# Patient Record
Sex: MALE | Race: WHITE | NOT HISPANIC OR LATINO | Employment: FULL TIME | URBAN - METROPOLITAN AREA
[De-identification: names, ages, dates, MRNs, and addresses within clinical notes are randomized per-mention and may not be internally consistent; named-entity substitution may affect disease eponyms.]

---

## 2017-02-07 ENCOUNTER — OFFICE VISIT (OUTPATIENT)
Dept: INTERNAL MEDICINE | Facility: CLINIC | Age: 37
End: 2017-02-07
Payer: COMMERCIAL

## 2017-02-07 VITALS
OXYGEN SATURATION: 100 % | DIASTOLIC BLOOD PRESSURE: 71 MMHG | TEMPERATURE: 98.6 F | WEIGHT: 190 LBS | BODY MASS INDEX: 24.38 KG/M2 | HEART RATE: 83 BPM | HEIGHT: 74 IN | SYSTOLIC BLOOD PRESSURE: 115 MMHG

## 2017-02-07 DIAGNOSIS — J02.0 ACUTE STREPTOCOCCAL PHARYNGITIS: ICD-10-CM

## 2017-02-07 DIAGNOSIS — R07.0 THROAT PAIN: Primary | ICD-10-CM

## 2017-02-07 LAB
DEPRECATED S PYO AG THROAT QL EIA: ABNORMAL
MICRO REPORT STATUS: ABNORMAL
SPECIMEN SOURCE: ABNORMAL

## 2017-02-07 PROCEDURE — 87880 STREP A ASSAY W/OPTIC: CPT | Performed by: NURSE PRACTITIONER

## 2017-02-07 PROCEDURE — 99213 OFFICE O/P EST LOW 20 MIN: CPT | Performed by: NURSE PRACTITIONER

## 2017-02-07 RX ORDER — PENICILLIN V POTASSIUM 500 MG/1
500 TABLET, FILM COATED ORAL 2 TIMES DAILY
Qty: 20 TABLET | Refills: 0 | Status: SHIPPED
Start: 2017-02-07 | End: 2017-03-06

## 2017-02-07 ASSESSMENT — PAIN SCALES - GENERAL: PAINLEVEL: SEVERE PAIN (7)

## 2017-02-07 NOTE — NURSING NOTE
"Chief Complaint   Patient presents with     Pharyngitis       Initial /71 mmHg  Pulse 83  Temp(Src) 98.6  F (37  C) (Oral)  Ht 6' 2.25\" (1.886 m)  Wt 190 lb (86.183 kg)  BMI 24.23 kg/m2  SpO2 100% Estimated body mass index is 24.23 kg/(m^2) as calculated from the following:    Height as of this encounter: 6' 2.25\" (1.886 m).    Weight as of this encounter: 190 lb (86.183 kg).  Medication Reconciliation: complete   LETY Gutierrez, SMA    "

## 2017-02-07 NOTE — PROGRESS NOTES
SUBJECTIVE:                                                    Tommy Best is a 37 year old male who presents to clinic today for the following health issues:      Acute Illness   Acute illness concerns: Sore Throat   Onset: 2 days     Fever: no     Chills/Sweats: YES- beginning chills    Headache (location?): no     Sinus Pressure:no    Conjunctivitis:  YES- both eyes    Ear Pain: no    Rhinorrhea: YES- green mucus    Congestion: YES- throat and ear    Sore Throat: YES     Cough: YES-productive of clear sputum    Wheeze: no     Decreased Appetite: no     Nausea: no     Vomiting: no     Diarrhea:  no     Dysuria/Freq.: YES- slightly pain urinating the on Monday.    Fatigue/Achiness: YES- all over body    Sick/Strep Exposure: YES- community [facility] with child     Therapies Tried and outcome: cough drops, advil, ibu,     Tolerating oral intake  States he normally gets pain with urination whenever he has a fever. Describes as mild. No dysuria today  Denies SOB and wheezing      Problem list and histories reviewed & adjusted, as indicated.  Additional history: none    Patient Active Problem List   Diagnosis     Attention deficit disorder     Photosensitivity     Hyperlipidemia LDL goal <160     Past Surgical History   Procedure Laterality Date     No history of surgery         Social History   Substance Use Topics     Smoking status: Never Smoker      Smokeless tobacco: Never Used     Alcohol Use: Yes     Family History   Problem Relation Age of Onset     Family History Negative Mother      Alcohol/Drug Father      alcholol     DIABETES Father      Type 2     DIABETES Maternal Grandfather      Type 1     DIABETES Paternal Grandmother      Type 2     Cancer - colorectal Paternal Grandfather      Age 60     Chemical Addiction Father      Alcoholic     Hypertension Paternal Grandfather      Hypertension Father      Colon Cancer Paternal Grandfather      Anxiety Disorder Father      Substance Abuse Father      Thyroid  "Disease Father      Obesity Father          Current Outpatient Prescriptions   Medication Sig Dispense Refill     penicillin V potassium (VEETID) 500 MG tablet Take 1 tablet (500 mg) by mouth 2 times daily 20 tablet 0     methylphenidate (METADATE CD) 30 MG CR capsule Take 1 capsule (30 mg) by mouth every morning On work days. 30 capsule 0     methylphenidate (METADATE CD) 20 MG CR capsule Take 1 capsule (20 mg) by mouth daily 1 capsule at night 30 capsule 0     methylphenidate (METADATE CD) 30 MG CR capsule Take 1 capsule (30 mg) by mouth every morning On work days. 30 capsule 0     methylphenidate (METADATE CD) 20 MG CR capsule Take 1 capsule (20 mg) by mouth daily 1 capsule at night 30 capsule 0     sildenafil (VIAGRA) 100 MG tablet Take 0.5-1 tablets ( mg) by mouth daily as needed for erectile dysfunction Take 30 min to 4 hours before intercourse.  Never use with nitroglycerin, terazosin or doxazosin. 6 tablet 11     Problem list, Medication list, Allergies, and Medical/Social/Surgical histories reviewed in [facility] and updated as appropriate.    ROS:  Constitutional, HEENT, cardiovascular, pulmonary, gi and gu systems are negative, except as otherwise noted.    OBJECTIVE:                                                    /71 mmHg  Pulse 83  Temp(Src) 98.6  F (37  C) (Oral)  Ht 6' 2.25\" (1.886 m)  Wt 190 lb (86.183 kg)  BMI 24.23 kg/m2  SpO2 100%  Body mass index is 24.23 kg/(m^2).  GENERAL: healthy, alert and no distress  EYES: Eyes grossly normal to inspection, PERRL and conjunctivae and sclerae normal  HENT: normal cephalic/atraumatic, ear canals and TM's normal, nose and mouth without ulcers or lesions, oropharynx clear, oral mucous membranes moist, tonsillar hypertrophy, tonsillar erythema, tonsillar exudate and sinuses: not tender  NECK: cervical adenopathy left  RESP: lungs clear to auscultation - no rales, rhonchi or wheezes  CV: regular rate and rhythm, normal S1 S2, no S3 or S4, no " murmur, click or rub    Diagnostic Test Results:  Strep screen - Positive     ASSESSMENT/PLAN:                                                        ICD-10-CM    1. Throat pain R07.0 Rapid strep screen   2. Acute streptococcal pharyngitis J02.0 penicillin V potassium (VEETID) 500 MG tablet       Discussed self cares and handout given  May take ibuprofen as needed for pain  Stay hydrated  Stay home from work for 24 hours after initiating antibiotics. Hand hygiene    NITA [facility]

## 2017-02-07 NOTE — MR AVS SNAPSHOT
After Visit Summary   2/7/2017    Tommy Best    MRN: [id number]           Patient Information     Date Of Birth          1980        Visit Information        Provider Department      2/7/2017 5:20 PM Kathy Shrestha APRN [facility]        Today's Diagnoses     Throat pain    -  1     Acute streptococcal pharyngitis           Care Instructions      Pharyngitis: Strep (Confirmed)     You have had a positive test for strep throat. Strep throat is a contagious illness. It is spread by coughing, kissing or by touching others after touching your mouth or nose. Symptoms include throat pain which is worse with swallowing, aching all over, headache and fever. It is treated with antibiotic medication. This should help you start to feel better within 1-2 days.  Home care    Rest at home. Drink plenty of fluids to avoid dehydration.    No work or school for the first 2 days of taking the antibiotics. After this time, you will not be contagious. You can then return to school or work if you are feeling better.     The antibiotic medication must be taken for the full 10 days, even if you feel better. This is very important to ensure the infection is treated. It is also important to prevent drug-resistent organisms from developing. If you were given an antibiotic shot, no more antibiotics are needed.    You may use acetaminophen (Tylenol) or ibuprofen (Motrin, Advil) to control pain or fever, unless another medicine was prescribed for this. (NOTE: If you have chronic liver or kidney disease or ever had a stomach ulcer or GI bleeding, talk with your doctor before using these medicines.)    Throat lozenges or sprays (such as Chloraseptic) help reduce pain. Gargling with warm salt water will also reduce throat pain. Dissolve 1/2 teaspoon of salt in 1 glass of warm water. This may be useful just before meals.     Soft foods are okay. Avoid salty or spicy foods.  Follow-up care  Follow  up with your healthcare provider or our staff if you are not improving over the next week.  When to seek medical advice  Call your healthcare provider right away if any of these occur:    Fever as directed by your doctor     New or worsening ear pain, sinus pain, or headache    Painful lumps in the back of neck    Stiff neck    Lymph nodes are getting larger or becoming soft in the middle    Inability to swallow liquids, excessive drooling, or inability to open mouth wide due to throat pain    Signs of dehydration (very dark urine or no urine, sunken eyes, dizziness)    Trouble breathing or noisy breathing    Muffled voice    New rash    8374-1591 [employer]. [address] [place] [address]. All rights reserved. This information is not intended as a substitute for professional medical care. Always follow your healthcare professional's instructions.              Follow-ups after your visit        Who to contact     If you have questions or need follow up information about today's clinic visit or your schedule please contact [facility] directly at [phone].  Normal or non-critical lab and imaging results will be communicated to you by [employer], letter or phone within 4 business days after the clinic has received the results. If you do not hear from us within 7 days, please contact the clinic through [employer] or phone. If you have a critical or abnormal lab result, we will notify you by phone as soon as possible.  Submit refill requests through [employer] or call your pharmacy and they will forward the refill request to us. Please allow 3 business days for your refill to be completed.          Additional Information About Your Visit        [employer] Information     [employer] gives you secure access to your electronic health record. If you see a primary care provider, you can also send messages to your care team and make appointments. If you have questions, please call your  "primary care clinic.  If you do not have a primary care provider, please call [phone] and they will assist you.        Care EveryWhere ID     This is your Care EveryWhere ID. This could be used by other organizations to access your [place] medical records  [id number]        Your Vitals Were     Pulse Temperature Height BMI (Body Mass Index) Pulse Oximetry       83 98.6  F (37  C) (Oral) 6' 2.25\" (1.886 m) 24.23 kg/m2 100%        Blood Pressure from Last 3 Encounters:   02/07/17 115/71   11/18/16 113/64   08/05/16 120/65    Weight from Last 3 Encounters:   02/07/17 190 lb (86.183 kg)   11/18/16 194 lb (87.998 kg)   08/05/16 192 lb (87.091 kg)              We Performed the Following     Rapid strep screen          Today's Medication Changes          These changes are accurate as of: 2/7/17  5:43 PM.  If you have any questions, ask your nurse or doctor.               Start taking these medicines.        Dose/Directions    penicillin V potassium 500 MG tablet   Commonly known as:  VEETID   Used for:  Acute streptococcal pharyngitis   Started by:  Kathy Shrestha APRN CNP        Dose:  500 mg   Take 1 tablet (500 mg) by mouth 2 times daily   Quantity:  20 tablet   Refills:  0            Where to get your medicines      These medications were sent to [facility]/pharmacy #[id number] - [place] [address] AT CORNER OF [address] [place] [id number]     Phone:  [phone]    - penicillin V potassium 500 MG tablet             Primary Care Provider Office Phone # Fax #    Moose Parson [phone]       [facility] [address] [facility] [address]        Thank you!     Thank you for choosing [facility]  for your care. Our goal is always to provide you with excellent care. Hearing back from our patients is one way we can continue to improve our services. Please take a few minutes to complete the written survey that you may " receive in the mail after your visit with us. Thank you!             Your Updated Medication List - Protect others around you: Learn how to safely use, store and throw away your medicines at www.disposemymeds.org.          This list is accurate as of: 2/7/17  5:43 PM.  Always use your most recent med list.                   Brand Name Dispense Instructions for use    * methylphenidate 30 MG CR capsule    METADATE CD    30 capsule    Take 1 capsule (30 mg) by mouth every morning On work days.       * methylphenidate 20 MG CR capsule    METADATE CD    30 capsule    Take 1 capsule (20 mg) by mouth daily 1 capsule at night       * methylphenidate 30 MG CR capsule    METADATE CD    30 capsule    Take 1 capsule (30 mg) by mouth every morning On work days.       * methylphenidate 20 MG CR capsule    METADATE CD    30 capsule    Take 1 capsule (20 mg) by mouth daily 1 capsule at night       penicillin V potassium 500 MG tablet    VEETID    20 tablet    Take 1 tablet (500 mg) by mouth 2 times daily       sildenafil 100 MG cap/tab    VIAGRA    6 tablet    Take 0.5-1 tablets ( mg) by mouth daily as needed for erectile dysfunction Take 30 min to 4 hours before intercourse.  Never use with nitroglycerin, terazosin or doxazosin.       * Notice:  This list has 4 medication(s) that are the same as other medications prescribed for you. Read the directions carefully, and ask your doctor or other care provider to review them with you.

## 2017-02-07 NOTE — PATIENT INSTRUCTIONS
Pharyngitis: Strep (Confirmed)     You have had a positive test for strep throat. Strep throat is a contagious illness. It is spread by coughing, kissing or by touching others after touching your mouth or nose. Symptoms include throat pain which is worse with swallowing, aching all over, headache and fever. It is treated with antibiotic medication. This should help you start to feel better within 1-2 days.  Home care    Rest at home. Drink plenty of fluids to avoid dehydration.    No work or school for the first 2 days of taking the antibiotics. After this time, you will not be contagious. You can then return to school or work if you are feeling better.     The antibiotic medication must be taken for the full 10 days, even if you feel better. This is very important to ensure the infection is treated. It is also important to prevent drug-resistent organisms from developing. If you were given an antibiotic shot, no more antibiotics are needed.    You may use acetaminophen (Tylenol) or ibuprofen (Motrin, Advil) to control pain or fever, unless another medicine was prescribed for this. (NOTE: If you have chronic liver or kidney disease or ever had a stomach ulcer or GI bleeding, talk with your doctor before using these medicines.)    Throat lozenges or sprays (such as Chloraseptic) help reduce pain. Gargling with warm salt water will also reduce throat pain. Dissolve 1/2 teaspoon of salt in 1 glass of warm water. This may be useful just before meals.     Soft foods are okay. Avoid salty or spicy foods.  Follow-up care  Follow up with your healthcare provider or our staff if you are not improving over the next week.  When to seek medical advice  Call your healthcare provider right away if any of these occur:    Fever as directed by your doctor     New or worsening ear pain, sinus pain, or headache    Painful lumps in the back of neck    Stiff neck    Lymph nodes are getting larger or becoming soft in the  middle    Inability to swallow liquids, excessive drooling, or inability to open mouth wide due to throat pain    Signs of dehydration (very dark urine or no urine, sunken eyes, dizziness)    Trouble breathing or noisy breathing    Muffled voice    New rash    0217-5138 [employer]. [address] [place] [address]. All rights reserved. This information is not intended as a substitute for professional medical care. Always follow your healthcare professional's instructions.

## 2017-03-06 ENCOUNTER — OFFICE VISIT (OUTPATIENT)
Dept: FAMILY MEDICINE | Facility: CLINIC | Age: 37
End: 2017-03-06
Payer: COMMERCIAL

## 2017-03-06 VITALS
WEIGHT: 192 LBS | SYSTOLIC BLOOD PRESSURE: 121 MMHG | BODY MASS INDEX: 24.49 KG/M2 | DIASTOLIC BLOOD PRESSURE: 70 MMHG | HEART RATE: 70 BPM | TEMPERATURE: 97.3 F | OXYGEN SATURATION: 98 %

## 2017-03-06 DIAGNOSIS — F98.8 ATTENTION DEFICIT DISORDER: Primary | ICD-10-CM

## 2017-03-06 PROCEDURE — 99214 OFFICE O/P EST MOD 30 MIN: CPT | Performed by: FAMILY MEDICINE

## 2017-03-06 RX ORDER — METHYLPHENIDATE HYDROCHLORIDE 20 MG/1
20 CAPSULE, EXTENDED RELEASE ORAL DAILY
Qty: 30 CAPSULE | Refills: 0 | Status: SHIPPED | OUTPATIENT
Start: 2017-03-06 | End: 2017-04-05

## 2017-03-06 RX ORDER — METHYLPHENIDATE HYDROCHLORIDE 30 MG/1
30 CAPSULE, EXTENDED RELEASE ORAL DAILY
Qty: 30 CAPSULE | Refills: 0 | Status: SHIPPED | OUTPATIENT
Start: 2017-05-07 | End: 2017-06-06

## 2017-03-06 RX ORDER — METHYLPHENIDATE HYDROCHLORIDE 30 MG/1
30 CAPSULE, EXTENDED RELEASE ORAL DAILY
Qty: 30 CAPSULE | Refills: 0 | Status: SHIPPED | OUTPATIENT
Start: 2017-03-06 | End: 2017-04-05

## 2017-03-06 RX ORDER — METHYLPHENIDATE HYDROCHLORIDE 20 MG/1
20 CAPSULE, EXTENDED RELEASE ORAL DAILY
Qty: 30 CAPSULE | Refills: 0 | Status: SHIPPED | OUTPATIENT
Start: 2017-04-06 | End: 2017-05-06

## 2017-03-06 RX ORDER — METHYLPHENIDATE HYDROCHLORIDE 30 MG/1
30 CAPSULE, EXTENDED RELEASE ORAL DAILY
Qty: 30 CAPSULE | Refills: 0 | Status: SHIPPED | OUTPATIENT
Start: 2017-04-06 | End: 2017-05-06

## 2017-03-06 RX ORDER — METHYLPHENIDATE HYDROCHLORIDE 20 MG/1
20 CAPSULE, EXTENDED RELEASE ORAL DAILY
Qty: 30 CAPSULE | Refills: 0 | Status: SHIPPED | OUTPATIENT
Start: 2017-05-07 | End: 2017-06-06

## 2017-03-06 ASSESSMENT — PAIN SCALES - GENERAL: PAINLEVEL: NO PAIN (0)

## 2017-03-06 NOTE — PROGRESS NOTES
SUBJECTIVE:                                                    Tommy Best is a 37 year old male who presents to clinic today for the following health issues:      Medication Followup of Methylphenidate    Taking Medication as prescribed: yes    Side Effects:  None    Medication Helping Symptoms:  yes       Problem list and histories reviewed & adjusted, as indicated.    No problems with medications     Patient does better with meds   He feels like this keeps him focus and stay on task     No problems with sleep     No palpitations       No tics   Patient had some eye twitching   They are more dry then normal     He does drink caffeine 1 cup of coffee and one diet coke a day     O: /70  Pulse 70  Temp 97.3  F (36.3  C) (Oral)  Wt 192 lb (87.1 kg)  SpO2 98%  BMI 24.49 kg/m2     Chest wall normal to inspection and palpation. Good excursion bilaterally. Lungs clear to auscultation. Good air movement bilaterally without rales, wheezes, or rhonchi.   Regular rate and  rhythm. S1 and S2 normal, no murmurs, clicks, gallops or rubs. No edema or JVD.      ICD-10-CM    1. Attention deficit disorder F98.8 methylphenidate (METADATE CD) 30 MG CR capsule     methylphenidate (METADATE CD) 30 MG CR capsule     methylphenidate (METADATE CD) 30 MG CR capsule     methylphenidate (METADATE CD) 20 MG CR capsule     methylphenidate (METADATE CD) 20 MG CR capsule     methylphenidate (METADATE CD) 20 MG CR capsule       Recheck in 3 months

## 2017-03-06 NOTE — MR AVS SNAPSHOT
After Visit Summary   3/6/2017    Tommy Best    MRN: [id number]           Patient Information     Date Of Birth          1980        Visit Information        Provider Department      3/6/2017 2:20 PM Moose Parson MD [facility]        Today's Diagnoses     Attention deficit disorder    -  1       Follow-ups after your visit        Who to contact     If you have questions or need follow up information about today's clinic visit or your schedule please contact [facility] directly at [phone].  Normal or non-critical lab and imaging results will be communicated to you by [employer], letter or phone within 4 business days after the clinic has received the results. If you do not hear from us within 7 days, please contact the clinic through [employer] or phone. If you have a critical or abnormal lab result, we will notify you by phone as soon as possible.  Submit refill requests through [employer] or call your pharmacy and they will forward the refill request to us. Please allow 3 business days for your refill to be completed.          Additional Information About Your Visit        MyChart Information     [employer] gives you secure access to your electronic health record. If you see a primary care provider, you can also send messages to your care team and make appointments. If you have questions, please call your primary care clinic.  If you do not have a primary care provider, please call [phone] and they will assist you.        Care EveryWhere ID     This is your Care EveryWhere ID. This could be used by other organizations to access your [place] medical records  [id number]        Your Vitals Were     Pulse Temperature Pulse Oximetry BMI (Body Mass Index)          70 97.3  F (36.3  C) (Oral) 98% 24.49 kg/m2         Blood Pressure from Last 3 Encounters:   03/06/17 121/70   02/07/17 115/71   11/18/16 113/64    Weight from Last 3 Encounters:    03/06/17 192 lb (87.1 kg)   02/07/17 190 lb (86.2 kg)   11/18/16 194 lb (88 kg)              Today, you had the following     No orders found for display         Today's Medication Changes          These changes are accurate as of: 3/6/17  2:53 PM.  If you have any questions, ask your nurse or doctor.               These medicines have changed or have updated prescriptions.        Dose/Directions    * methylphenidate 30 MG CR capsule   Commonly known as:  METADATE CD   This may have changed:  Another medication with the same name was added. Make sure you understand how and when to take each.   Used for:  Attention deficit disorder   Changed by:  Moose Parson MD        Dose:  30 mg   Take 1 capsule (30 mg) by mouth every morning On work days.   Quantity:  30 capsule   Refills:  0       * methylphenidate 20 MG CR capsule   Commonly known as:  METADATE CD   This may have changed:  Another medication with the same name was added. Make sure you understand how and when to take each.   Used for:  Attention deficit disorder   Changed by:  Moose Parson MD        Dose:  20 mg   Take 1 capsule (20 mg) by mouth daily 1 capsule at night   Quantity:  30 capsule   Refills:  0       * methylphenidate 30 MG CR capsule   Commonly known as:  METADATE CD   This may have changed:  Another medication with the same name was added. Make sure you understand how and when to take each.   Used for:  Attention deficit disorder   Changed by:  Moose Parson MD        Dose:  30 mg   Take 1 capsule (30 mg) by mouth every morning On work days.   Quantity:  30 capsule   Refills:  0       * methylphenidate 20 MG CR capsule   Commonly known as:  METADATE CD   This may have changed:  Another medication with the same name was added. Make sure you understand how and when to take each.   Used for:  Attention deficit disorder   Changed by:  Moose Parson MD        Dose:  20 mg   Take 1 capsule (20 mg) by mouth daily 1  capsule at night   Quantity:  30 capsule   Refills:  0       * methylphenidate 30 MG CR capsule   Commonly known as:  METADATE CD   This may have changed:  You were already taking a medication with the same name, and this prescription was added. Make sure you understand how and when to take each.   Used for:  Attention deficit disorder   Changed by:  Moose Parson MD        Dose:  30 mg   Take 1 capsule (30 mg) by mouth daily   Quantity:  30 capsule   Refills:  0       * methylphenidate 20 MG CR capsule   Commonly known as:  METADATE CD   This may have changed:  You were already taking a medication with the same name, and this prescription was added. Make sure you understand how and when to take each.   Used for:  Attention deficit disorder   Changed by:  Moose Parson MD        Dose:  20 mg   Take 1 capsule (20 mg) by mouth daily   Quantity:  30 capsule   Refills:  0       * methylphenidate 30 MG CR capsule   Commonly known as:  METADATE CD   This may have changed:  You were already taking a medication with the same name, and this prescription was added. Make sure you understand how and when to take each.   Used for:  Attention deficit disorder   Changed by:  Moose Parson MD        Dose:  30 mg   Start taking on:  4/6/2017   Take 1 capsule (30 mg) by mouth daily   Quantity:  30 capsule   Refills:  0       * methylphenidate 20 MG CR capsule   Commonly known as:  METADATE CD   This may have changed:  You were already taking a medication with the same name, and this prescription was added. Make sure you understand how and when to take each.   Used for:  Attention deficit disorder   Changed by:  Moose Parson MD        Dose:  20 mg   Start taking on:  4/6/2017   Take 1 capsule (20 mg) by mouth daily   Quantity:  30 capsule   Refills:  0       * methylphenidate 30 MG CR capsule   Commonly known as:  METADATE CD   This may have changed:  You were already taking a medication with the same  name, and this prescription was added. Make sure you understand how and when to take each.   Used for:  Attention deficit disorder   Changed by:  Moose Parson MD        Dose:  30 mg   Start taking on:  5/7/2017   Take 1 capsule (30 mg) by mouth daily   Quantity:  30 capsule   Refills:  0       * methylphenidate 20 MG CR capsule   Commonly known as:  METADATE CD   This may have changed:  You were already taking a medication with the same name, and this prescription was added. Make sure you understand how and when to take each.   Used for:  Attention deficit disorder   Changed by:  Moose Parson MD        Dose:  20 mg   Start taking on:  5/7/2017   Take 1 capsule (20 mg) by mouth daily   Quantity:  30 capsule   Refills:  0       * Notice:  This list has 10 medication(s) that are the same as other medications prescribed for you. Read the directions carefully, and ask your doctor or other care provider to review them with you.         Where to get your medicines      Some of these will need a paper prescription and others can be bought over the counter.  Ask your nurse if you have questions.     Bring a paper prescription for each of these medications     methylphenidate 20 MG CR capsule    methylphenidate 20 MG CR capsule    methylphenidate 20 MG CR capsule    methylphenidate 30 MG CR capsule    methylphenidate 30 MG CR capsule    methylphenidate 30 MG CR capsule                Primary Care Provider Office Phone # Fax #    Moose Parson [phone]       [facility] [address] [facility] [address]        Thank you!     Thank you for choosing [facility]  for your care. Our goal is always to provide you with excellent care. Hearing back from our patients is one way we can continue to improve our services. Please take a few minutes to complete the written survey that you may receive in the mail after your visit with us. Thank  you!             Your Updated Medication List - Protect others around you: Learn how to safely use, store and throw away your medicines at www.disposemymeds.org.          This list is accurate as of: 3/6/17  2:53 PM.  Always use your most recent med list.                   Brand Name Dispense Instructions for use    * methylphenidate 30 MG CR capsule    METADATE CD    30 capsule    Take 1 capsule (30 mg) by mouth every morning On work days.       * methylphenidate 20 MG CR capsule    METADATE CD    30 capsule    Take 1 capsule (20 mg) by mouth daily 1 capsule at night       * methylphenidate 30 MG CR capsule    METADATE CD    30 capsule    Take 1 capsule (30 mg) by mouth every morning On work days.       * methylphenidate 20 MG CR capsule    METADATE CD    30 capsule    Take 1 capsule (20 mg) by mouth daily 1 capsule at night       * methylphenidate 30 MG CR capsule    METADATE CD    30 capsule    Take 1 capsule (30 mg) by mouth daily       * methylphenidate 20 MG CR capsule    METADATE CD    30 capsule    Take 1 capsule (20 mg) by mouth daily       * methylphenidate 30 MG CR capsule   Start taking on:  4/6/2017    METADATE CD    30 capsule    Take 1 capsule (30 mg) by mouth daily       * methylphenidate 20 MG CR capsule   Start taking on:  4/6/2017    METADATE CD    30 capsule    Take 1 capsule (20 mg) by mouth daily       * methylphenidate 30 MG CR capsule   Start taking on:  5/7/2017    METADATE CD    30 capsule    Take 1 capsule (30 mg) by mouth daily       * methylphenidate 20 MG CR capsule   Start taking on:  5/7/2017    METADATE CD    30 capsule    Take 1 capsule (20 mg) by mouth daily       sildenafil 100 MG cap/tab    VIAGRA    6 tablet    Take 0.5-1 tablets ( mg) by mouth daily as needed for erectile dysfunction Take 30 min to 4 hours before intercourse.  Never use with nitroglycerin, terazosin or doxazosin.       * Notice:  This list has 10 medication(s) that are the same as other medications  prescribed for you. Read the directions carefully, and ask your doctor or other care provider to review them with you.

## 2017-03-06 NOTE — NURSING NOTE
"Chief Complaint   Patient presents with     Recheck Medication       Initial /70  Pulse 70  Temp 97.3  F (36.3  C) (Oral)  Wt 192 lb (87.1 kg)  SpO2 98%  BMI 24.49 kg/m2 Estimated body mass index is 24.49 kg/(m^2) as calculated from the following:    Height as of 2/7/17: 6' 2.25\" (1.886 m).    Weight as of this encounter: 192 lb (87.1 kg).  Medication Reconciliation: complete   Devante DAVIDSON      "

## 2017-03-16 ENCOUNTER — OFFICE VISIT (OUTPATIENT)
Dept: INTERNAL MEDICINE | Facility: CLINIC | Age: 37
End: 2017-03-16
Payer: COMMERCIAL

## 2017-03-16 VITALS
TEMPERATURE: 97.4 F | WEIGHT: 193 LBS | HEIGHT: 74 IN | OXYGEN SATURATION: 96 % | SYSTOLIC BLOOD PRESSURE: 113 MMHG | DIASTOLIC BLOOD PRESSURE: 65 MMHG | HEART RATE: 83 BPM | BODY MASS INDEX: 24.77 KG/M2

## 2017-03-16 DIAGNOSIS — R07.0 THROAT PAIN: Primary | ICD-10-CM

## 2017-03-16 DIAGNOSIS — Z20.818 STREP THROAT EXPOSURE: ICD-10-CM

## 2017-03-16 DIAGNOSIS — F98.8 ATTENTION DEFICIT DISORDER: ICD-10-CM

## 2017-03-16 LAB
DEPRECATED S PYO AG THROAT QL EIA: NORMAL
FLUAV+FLUBV AG SPEC QL: NEGATIVE
FLUAV+FLUBV AG SPEC QL: NORMAL
MICRO REPORT STATUS: NORMAL
SPECIMEN SOURCE: NORMAL
SPECIMEN SOURCE: NORMAL

## 2017-03-16 PROCEDURE — 87880 STREP A ASSAY W/OPTIC: CPT | Performed by: INTERNAL MEDICINE

## 2017-03-16 PROCEDURE — 87081 CULTURE SCREEN ONLY: CPT | Performed by: INTERNAL MEDICINE

## 2017-03-16 PROCEDURE — 87804 INFLUENZA ASSAY W/OPTIC: CPT | Performed by: INTERNAL MEDICINE

## 2017-03-16 PROCEDURE — 99214 OFFICE O/P EST MOD 30 MIN: CPT | Performed by: INTERNAL MEDICINE

## 2017-03-16 RX ORDER — AMOXICILLIN 875 MG
875 TABLET ORAL 2 TIMES DAILY
Qty: 20 TABLET | Refills: 0 | Status: SHIPPED | OUTPATIENT
Start: 2017-03-16 | End: 2017-04-03

## 2017-03-16 NOTE — PROGRESS NOTES
SUBJECTIVE:                                                    Tommy Best is a 37 year old male who presents to clinic today for the following health issues:      ENT Symptoms             Symptoms: cc Present Absent Comment   Fever/Chills   x    Fatigue  x     Muscle Aches   x    Eye Irritation   x    Sneezing   x    Nasal Alexander/Drg  x     Sinus Pressure/Pain   x    Loss of smell   x    Dental pain   x    Sore Throat  x     Swollen Glands  x     Ear Pain/Fullness  x     Cough  x     Wheeze   x    Chest Pain   x    Shortness of breath  x     Rash   x    Other         Symptom duration:  4 days    Symptom severity:  moderate   Treatments tried:  tylenol,ibuprofen   Contacts:                Problem list and histories reviewed & adjusted, as indicated.  Additional history: as documented    Patient Active Problem List   Diagnosis     Attention deficit disorder     Photosensitivity     Hyperlipidemia LDL goal <160     Past Surgical History   Procedure Laterality Date     No history of surgery         Social History   Substance Use Topics     Smoking status: Never Smoker     Smokeless tobacco: Never Used     Alcohol use Yes     Family History   Problem Relation Age of Onset     Family History Negative Mother      Alcohol/Drug Father      alcholol     DIABETES Father      Type 2     Chemical Addiction Father      Alcoholic     Hypertension Father      Anxiety Disorder Father      Substance Abuse Father      Thyroid Disease Father      Obesity Father      DIABETES Maternal Grandfather      Type 1     DIABETES Paternal Grandmother      Type 2     Cancer - colorectal Paternal Grandfather      Age 60     Hypertension Paternal Grandfather      Colon Cancer Paternal Grandfather            Reviewed and updated as needed this visit by clinical staff  Tobacco  Allergies       Reviewed and updated as needed this visit by Provider         ROS:  Constitutional, HEENT, cardiovascular, pulmonary, gi and gu systems are negative,  "except as otherwise noted.    OBJECTIVE:                                                    /65 (BP Location: Right arm, Patient Position: Chair, Cuff Size: Adult Regular)  Pulse 83  Temp 97.4  F (36.3  C) (Oral)  Ht 6' 2.25\" (1.886 m)  Wt 193 lb (87.5 kg)  SpO2 96%  BMI 24.61 kg/m2  Body mass index is 24.61 kg/(m^2).  GENERAL: healthy, alert and no distress  EYES: Eyes grossly normal to inspection, PERRL and conjunctivae and sclerae normal  HENT: ear canals and TM's normal, nose and mouth without ulcers or lesions.  Oropharynx is red, large tonsils without pus.  NECK: no adenopathy, no asymmetry, masses, or scars and thyroid normal to palpation  RESP: lungs clear to auscultation - no rales, rhonchi or wheezes  CV: regular rate and rhythm, normal S1 S2, no S3 or S4, no murmur, click or rub, no peripheral edema and peripheral pulses strong  PSYCH: mentation appears normal, affect normal/bright    Diagnostic Test Results:  Strep and flu pending     ASSESSMENT/PLAN:                                                            ICD-10-CM    1. Throat pain R07.0 Strep, Rapid Screen     Influenza A/B antigen   2. Strep throat exposure Z20.818 amoxicillin (AMOXIL) 875 MG tablet   3. Attention deficit disorder F98.8           Recurrent throat pain, exposure to Strep.  H/o recent Rx for strep.   Will check flu as well.    Patient does not plan to miss work      Luisa Chen MD  [facility]    "

## 2017-03-16 NOTE — MR AVS SNAPSHOT
After Visit Summary   3/16/2017    Tommy Best    MRN: [id number]           Patient Information     Date Of Birth          1980        Visit Information        Provider Department      3/16/2017 7:40 AM Luisa Chen MD [facility]        Today's Diagnoses     Throat pain    -  1    Strep throat exposure        Attention deficit disorder          Care Instructions    Amoxicillin 875 twice daily X 10 days            Follow-ups after your visit        Follow-up notes from your care team     Return if symptoms worsen or fail to improve.      Who to contact     If you have questions or need follow up information about today's clinic visit or your schedule please contact [facility] directly at [phone].  Normal or non-critical lab and imaging results will be communicated to you by MyChart, letter or phone within 4 business days after the clinic has received the results. If you do not hear from us within 7 days, please contact the clinic through [employer] or phone. If you have a critical or abnormal lab result, we will notify you by phone as soon as possible.  Submit refill requests through [employer] or call your pharmacy and they will forward the refill request to us. Please allow 3 business days for your refill to be completed.          Additional Information About Your Visit        MyChart Information     [employer] gives you secure access to your electronic health record. If you see a primary care provider, you can also send messages to your care team and make appointments. If you have questions, please call your primary care clinic.  If you do not have a primary care provider, please call [phone] and they will assist you.        Care EveryWhere ID     This is your Care EveryWhere ID. This could be used by other organizations to access your [place] medical records  [id number]        Your Vitals Were     Pulse Temperature Height Pulse  "Oximetry BMI (Body Mass Index)       83 97.4  F (36.3  C) (Oral) 6' 2.25\" (1.886 m) 96% 24.61 kg/m2        Blood Pressure from Last 3 Encounters:   03/16/17 113/65   03/06/17 121/70   02/07/17 115/71    Weight from Last 3 Encounters:   03/16/17 193 lb (87.5 kg)   03/06/17 192 lb (87.1 kg)   02/07/17 190 lb (86.2 kg)              We Performed the Following     Influenza A/B antigen     Strep, Rapid Screen          Today's Medication Changes          These changes are accurate as of: 3/16/17  8:10 AM.  If you have any questions, ask your nurse or doctor.               Start taking these medicines.        Dose/Directions    amoxicillin 875 MG tablet   Commonly known as:  AMOXIL   Used for:  Strep throat exposure   Started by:  Luisa Chen MD        Dose:  875 mg   Take 1 tablet (875 mg) by mouth 2 times daily   Quantity:  20 tablet   Refills:  0            Where to get your medicines      These medications were sent to [facility]/pharmacy #[id number] - [place] [address] AT CORNER OF [address] [place] [address]     Phone:  [phone]     amoxicillin 875 MG tablet                Primary Care Provider Office Phone # Fax #    Moose Parson [phone]       [facility] [address] [place]. \[facility].\"" [id number]        Thank you!     Thank you for choosing [facility]  for your care. Our goal is always to provide you with excellent care. Hearing back from our patients is one way we can continue to improve our services. Please take a few minutes to complete the written survey that you may receive in the mail after your visit with us. Thank you!             Your Updated Medication List - Protect others around you: Learn how to safely use, store and throw away your medicines at www.disposemymeds.org.          This list is accurate as of: 3/16/17  8:10 AM.  Always use your most recent med list.                   Brand Name Dispense " Instructions for use    amoxicillin 875 MG tablet    AMOXIL    20 tablet    Take 1 tablet (875 mg) by mouth 2 times daily       * methylphenidate 30 MG CR capsule    METADATE CD    30 capsule    Take 1 capsule (30 mg) by mouth every morning On work days.       * methylphenidate 20 MG CR capsule    METADATE CD    30 capsule    Take 1 capsule (20 mg) by mouth daily 1 capsule at night       * methylphenidate 30 MG CR capsule    METADATE CD    30 capsule    Take 1 capsule (30 mg) by mouth every morning On work days.       * methylphenidate 20 MG CR capsule    METADATE CD    30 capsule    Take 1 capsule (20 mg) by mouth daily 1 capsule at night       * methylphenidate 30 MG CR capsule    METADATE CD    30 capsule    Take 1 capsule (30 mg) by mouth daily       * methylphenidate 20 MG CR capsule    METADATE CD    30 capsule    Take 1 capsule (20 mg) by mouth daily       * methylphenidate 30 MG CR capsule   Start taking on:  4/6/2017    METADATE CD    30 capsule    Take 1 capsule (30 mg) by mouth daily       * methylphenidate 20 MG CR capsule   Start taking on:  4/6/2017    METADATE CD    30 capsule    Take 1 capsule (20 mg) by mouth daily       * methylphenidate 30 MG CR capsule   Start taking on:  5/7/2017    METADATE CD    30 capsule    Take 1 capsule (30 mg) by mouth daily       * methylphenidate 20 MG CR capsule   Start taking on:  5/7/2017    METADATE CD    30 capsule    Take 1 capsule (20 mg) by mouth daily       sildenafil 100 MG cap/tab    VIAGRA    6 tablet    Take 0.5-1 tablets ( mg) by mouth daily as needed for erectile dysfunction Take 30 min to 4 hours before intercourse.  Never use with nitroglycerin, terazosin or doxazosin.       * Notice:  This list has 10 medication(s) that are the same as other medications prescribed for you. Read the directions carefully, and ask your doctor or other care provider to review them with you.

## 2017-03-16 NOTE — NURSING NOTE
"Chief Complaint   Patient presents with     Pharyngitis       Initial /65 (BP Location: Right arm, Patient Position: Chair, Cuff Size: Adult Regular)  Pulse 83  Temp 97.4  F (36.3  C) (Oral)  Ht 6' 2.25\" (1.886 m)  Wt 193 lb (87.5 kg)  SpO2 96%  BMI 24.61 kg/m2 Estimated body mass index is 24.61 kg/(m^2) as calculated from the following:    Height as of this encounter: 6' 2.25\" (1.886 m).    Weight as of this encounter: 193 lb (87.5 kg).  Medication Reconciliation: complete   Gardenia Martinez ma      "

## 2017-03-18 LAB
BACTERIA SPEC CULT: NORMAL
MICRO REPORT STATUS: NORMAL
SPECIMEN SOURCE: NORMAL

## 2017-04-03 ENCOUNTER — OFFICE VISIT (OUTPATIENT)
Dept: FAMILY MEDICINE | Facility: CLINIC | Age: 37
End: 2017-04-03
Payer: COMMERCIAL

## 2017-04-03 VITALS
DIASTOLIC BLOOD PRESSURE: 72 MMHG | WEIGHT: 195 LBS | OXYGEN SATURATION: 97 % | BODY MASS INDEX: 24.87 KG/M2 | HEART RATE: 70 BPM | TEMPERATURE: 98 F | SYSTOLIC BLOOD PRESSURE: 125 MMHG

## 2017-04-03 DIAGNOSIS — R07.0 THROAT PAIN: Primary | ICD-10-CM

## 2017-04-03 PROCEDURE — 99213 OFFICE O/P EST LOW 20 MIN: CPT | Performed by: FAMILY MEDICINE

## 2017-04-03 NOTE — NURSING NOTE
"Chief Complaint   Patient presents with     Pharyngitis     Lingering sore throat       Initial /72  Pulse 70  Temp 98  F (36.7  C) (Oral)  Wt 195 lb (88.5 kg)  SpO2 97%  BMI 24.87 kg/m2 Estimated body mass index is 24.87 kg/(m^2) as calculated from the following:    Height as of 3/16/17: 6' 2.25\" (1.886 m).    Weight as of this encounter: 195 lb (88.5 kg).  Medication Reconciliation: complete.  Devante DAVIDSON      "

## 2017-04-03 NOTE — MR AVS SNAPSHOT
After Visit Summary   4/3/2017    Tommy Best    MRN: [id number]           Patient Information     Date Of Birth          1980        Visit Information        Provider Department      4/3/2017 3:40 PM Moose Parson MD [facility]        Today's Diagnoses     Throat pain    -  1       Follow-ups after your visit        Who to contact     If you have questions or need follow up information about today's clinic visit or your schedule please contact [facility] directly at [phone].  Normal or non-critical lab and imaging results will be communicated to you by [facility], letter or phone within 4 business days after the clinic has received the results. If you do not hear from us within 7 days, please contact the clinic through [employer] or phone. If you have a critical or abnormal lab result, we will notify you by phone as soon as possible.  Submit refill requests through [employer] or call your pharmacy and they will forward the refill request to us. Please allow 3 business days for your refill to be completed.          Additional Information About Your Visit        MyChart Information     [employer] gives you secure access to your electronic health record. If you see a primary care provider, you can also send messages to your care team and make appointments. If you have questions, please call your primary care clinic.  If you do not have a primary care provider, please call [phone] and they will assist you.        Care EveryWhere ID     This is your Care EveryWhere ID. This could be used by other organizations to access your [place] medical records  [id number]        Your Vitals Were     Pulse Temperature Pulse Oximetry BMI (Body Mass Index)          70 98  F (36.7  C) (Oral) 97% 24.87 kg/m2         Blood Pressure from Last 3 Encounters:   04/03/17 125/72   03/16/17 113/65   03/06/17 121/70    Weight from Last 3 Encounters:   04/03/17 195 lb  (88.5 kg)   03/16/17 193 lb (87.5 kg)   03/06/17 192 lb (87.1 kg)              Today, you had the following     No orders found for display         Today's Medication Changes          These changes are accurate as of: 4/3/17  5:05 PM.  If you have any questions, ask your nurse or doctor.               Stop taking these medicines if you haven't already. Please contact your care team if you have questions.     amoxicillin 875 MG tablet   Commonly known as:  AMOXIL   Stopped by:  Moose Parson MD                    Primary Care Provider Office Phone # Fax #    Moose Parson [phone]       [facility] [address] [facility] [address]        Thank you!     Thank you for choosing [facility]  for your care. Our goal is always to provide you with excellent care. Hearing back from our patients is one way we can continue to improve our services. Please take a few minutes to complete the written survey that you may receive in the mail after your visit with us. Thank you!             Your Updated Medication List - Protect others around you: Learn how to safely use, store and throw away your medicines at www.disposemymeds.org.          This list is accurate as of: 4/3/17  5:05 PM.  Always use your most recent med list.                   Brand Name Dispense Instructions for use    * methylphenidate 30 MG CR capsule    METADATE CD    30 capsule    Take 1 capsule (30 mg) by mouth every morning On work days.       * methylphenidate 20 MG CR capsule    METADATE CD    30 capsule    Take 1 capsule (20 mg) by mouth daily 1 capsule at night       * methylphenidate 30 MG CR capsule    METADATE CD    30 capsule    Take 1 capsule (30 mg) by mouth every morning On work days.       * methylphenidate 20 MG CR capsule    METADATE CD    30 capsule    Take 1 capsule (20 mg) by mouth daily 1 capsule at night       * methylphenidate 30 MG CR capsule    METADATE CD     30 capsule    Take 1 capsule (30 mg) by mouth daily       * methylphenidate 20 MG CR capsule    METADATE CD    30 capsule    Take 1 capsule (20 mg) by mouth daily       * methylphenidate 30 MG CR capsule   Start taking on:  4/6/2017    METADATE CD    30 capsule    Take 1 capsule (30 mg) by mouth daily       * methylphenidate 20 MG CR capsule   Start taking on:  4/6/2017    METADATE CD    30 capsule    Take 1 capsule (20 mg) by mouth daily       * methylphenidate 30 MG CR capsule   Start taking on:  5/7/2017    METADATE CD    30 capsule    Take 1 capsule (30 mg) by mouth daily       * methylphenidate 20 MG CR capsule   Start taking on:  5/7/2017    METADATE CD    30 capsule    Take 1 capsule (20 mg) by mouth daily       sildenafil 100 MG cap/tab    VIAGRA    6 tablet    Take 0.5-1 tablets ( mg) by mouth daily as needed for erectile dysfunction Take 30 min to 4 hours before intercourse.  Never use with nitroglycerin, terazosin or doxazosin.       * Notice:  This list has 10 medication(s) that are the same as other medications prescribed for you. Read the directions carefully, and ask your doctor or other care provider to review them with you.

## 2017-04-03 NOTE — PROGRESS NOTES
SUBJECTIVE:                                                    Tommy Best is a 37 year old male who presents to clinic today for the following health issues:      Acute Illness   Acute illness concerns: Lingering sore throat  Onset:     Fever: no    Chills/Sweats: no    Headache (location?): no    Sinus Pressure:no    Conjunctivitis:  no    Ear Pain: Pressure - Bilat    Rhinorrhea: no    Congestion: no    Sore Throat: YES     Cough: Tickle    Wheeze: no    Decreased Appetite: no    Nausea: no    Vomiting: no    Diarrhea:  no    Dysuria/Freq.: no    Fatigue/Achiness: no    Sick/Strep Exposure: no       Problem list and histories reviewed & adjusted, as indicated.  Patient rates pain at worst as 8/10   Now it is down to a 3 and today he did not even feel like the throat was a problem     Family history of strep throat     Patient has been treated     O: /72  Pulse 70  Temp 98  F (36.7  C) (Oral)  Wt 195 lb (88.5 kg)  SpO2 97%  BMI 24.87 kg/m2    Head: Normocephalic, atraumatic.  Eyes: Conjunctiva clear, non icteric. PERRLA.  Ears: External ears and TMs normal BL.  Nose: Septum midline, nasal mucosa pink and moist. No discharge.  Mouth / Throat: Normal dentition.  No oral lesions. Pharynx non erythematous, tonsils without hypertrophy.  Neck: Supple, no enlarged LN, trachea midline.    Chest wall normal to inspection and palpation. Good excursion bilaterally. Lungs clear to auscultation. Good air movement bilaterally without rales, wheezes, or rhonchi.   Regular rate and  rhythm. S1 and S2 normal, no murmurs, clicks, gallops or rubs. No edema or JVD.      ICD-10-CM    1. Throat pain R07.0      Observation for another 2 weeks   rtc if worsening

## 2017-06-22 ENCOUNTER — MYC MEDICAL ADVICE (OUTPATIENT)
Dept: FAMILY MEDICINE | Facility: CLINIC | Age: 37
End: 2017-06-22

## 2017-06-30 ENCOUNTER — OFFICE VISIT (OUTPATIENT)
Dept: FAMILY MEDICINE | Facility: CLINIC | Age: 37
End: 2017-06-30
Payer: COMMERCIAL

## 2017-06-30 VITALS
OXYGEN SATURATION: 98 % | SYSTOLIC BLOOD PRESSURE: 118 MMHG | TEMPERATURE: 97.7 F | BODY MASS INDEX: 25.51 KG/M2 | HEART RATE: 87 BPM | DIASTOLIC BLOOD PRESSURE: 76 MMHG | WEIGHT: 200 LBS

## 2017-06-30 DIAGNOSIS — F98.8 ATTENTION DEFICIT DISORDER (ADD) WITHOUT HYPERACTIVITY: Primary | ICD-10-CM

## 2017-06-30 DIAGNOSIS — N52.8 OTHER MALE ERECTILE DYSFUNCTION: ICD-10-CM

## 2017-06-30 DIAGNOSIS — M76.30 ITB SYNDROME: ICD-10-CM

## 2017-06-30 DIAGNOSIS — M54.2 CERVICALGIA: ICD-10-CM

## 2017-06-30 PROCEDURE — 99214 OFFICE O/P EST MOD 30 MIN: CPT | Performed by: NURSE PRACTITIONER

## 2017-06-30 RX ORDER — METHYLPHENIDATE HYDROCHLORIDE 30 MG/1
30 CAPSULE, EXTENDED RELEASE ORAL DAILY
Qty: 30 CAPSULE | Refills: 0 | Status: SHIPPED | OUTPATIENT
Start: 2017-06-30 | End: 2017-09-25

## 2017-06-30 RX ORDER — METHYLPHENIDATE HYDROCHLORIDE 20 MG/1
20 CAPSULE, EXTENDED RELEASE ORAL DAILY
Qty: 30 CAPSULE | Refills: 0 | Status: SHIPPED | OUTPATIENT
Start: 2017-08-31 | End: 2017-09-15

## 2017-06-30 RX ORDER — SILDENAFIL 100 MG/1
50-100 TABLET, FILM COATED ORAL DAILY PRN
Qty: 6 TABLET | Refills: 11 | Status: SHIPPED | OUTPATIENT
Start: 2017-06-30 | End: 2018-06-14

## 2017-06-30 RX ORDER — METHYLPHENIDATE HYDROCHLORIDE 20 MG/1
20 CAPSULE, EXTENDED RELEASE ORAL DAILY
Qty: 30 CAPSULE | Refills: 0 | Status: SHIPPED | OUTPATIENT
Start: 2017-07-31 | End: 2017-08-30

## 2017-06-30 RX ORDER — METHYLPHENIDATE HYDROCHLORIDE 30 MG/1
30 CAPSULE, EXTENDED RELEASE ORAL DAILY
Qty: 30 CAPSULE | Refills: 0 | Status: SHIPPED | OUTPATIENT
Start: 2017-07-31 | End: 2017-08-30

## 2017-06-30 RX ORDER — METHYLPHENIDATE HYDROCHLORIDE 20 MG/1
20 CAPSULE, EXTENDED RELEASE ORAL DAILY
Qty: 30 CAPSULE | Refills: 0 | Status: SHIPPED | OUTPATIENT
Start: 2017-06-30 | End: 2017-09-25

## 2017-06-30 RX ORDER — METHYLPHENIDATE HYDROCHLORIDE 30 MG/1
30 CAPSULE, EXTENDED RELEASE ORAL DAILY
Qty: 30 CAPSULE | Refills: 0 | Status: SHIPPED | OUTPATIENT
Start: 2017-08-31 | End: 2017-09-15

## 2017-06-30 ASSESSMENT — PAIN SCALES - GENERAL: PAINLEVEL: NO PAIN (0)

## 2017-06-30 NOTE — NURSING NOTE
"Chief Complaint   Patient presents with     Recheck Medication       Initial /76 (BP Location: Right arm, Patient Position: Chair, Cuff Size: Adult Large)  Pulse 87  Temp 97.7  F (36.5  C) (Oral)  Wt 200 lb (90.7 kg)  SpO2 98%  BMI 25.51 kg/m2 Estimated body mass index is 25.51 kg/(m^2) as calculated from the following:    Height as of 3/16/17: 6' 2.25\" (1.886 m).    Weight as of this encounter: 200 lb (90.7 kg).  Medication Reconciliation: complete.  DA Hernandez MA      "

## 2017-06-30 NOTE — MR AVS SNAPSHOT
After Visit Summary   6/30/2017    Tommy Best    MRN: [id number]           Patient Information     Date Of Birth          1980        Visit Information        Provider Department      6/30/2017 8:00 AM Kathy Shrestha APRN [facility]        Today's Diagnoses     Attention deficit disorder (ADD) without hyperactivity    -  1    Cervicalgia        ITB syndrome        Other male erectile dysfunction           Follow-ups after your visit        Additional Services     [facility] PT, HAND, AND CHIROPRACTIC REFERRAL       **This order will print in the [facility] Scheduling Office**    Physical Therapy, Hand Therapy and Chiropractic Care are available through:    *[facility]  *[facility]  *[facility]    Call one number to schedule at any of the above locations: [phone].    Your provider has referred you to: Physical Therapy at [facility] or [facility]    Indication/Reason for Referral: Neck Pain  Onset of Illness: several years, worsened 1 year ago. Tried chiropractor but didn't help  Therapy Orders: Evaluate and Treat  Special Programs: None  Special Request: None    Radames Crum      Additional Comments for the Therapist or Chiropractor:       Please be aware that coverage of these services is subject to the terms and limitations of your health insurance plan.  Call member services at your health plan with any benefit or coverage questions.      Please bring the following to your appointment:    *Your personal calendar for scheduling future appointments  *Comfortable clothing            ORTHO  REFERRAL       [facility] is referring you to the Orthopedic  Services at [facility].       The  Representative will assist you in the coordination of your Orthopedic and Musculoskeletal Care as prescribed by your physician.    The  Representative will call you  within 1 business day to help schedule your appointment, or you may contact the  Representative at:    All areas ~ [phone]     Type of Referral : [facility] / Foot & Ankle Surgery       Timeframe requested: Routine    Coverage of these services is subject to the terms and limitations of your health insurance plan.  Please call member services at your health plan with any benefit or coverage questions.      If X-rays, CT or MRI's have been performed, please contact the facility where they were done to arrange for , prior to your scheduled appointment.  Please bring this referral request to your appointment and present it to your specialist.                  Who to contact     If you have questions or need follow up information about today's clinic visit or your schedule please contact [facility] directly at [phone].  Normal or non-critical lab and imaging results will be communicated to you by MyChart, letter or phone within 4 business days after the clinic has received the results. If you do not hear from us within 7 days, please contact the clinic through [employer] or phone. If you have a critical or abnormal lab result, we will notify you by phone as soon as possible.  Submit refill requests through [employer] or call your pharmacy and they will forward the refill request to us. Please allow 3 business days for your refill to be completed.          Additional Information About Your Visit        [employer] Information     [employer] gives you secure access to your electronic health record. If you see a primary care provider, you can also send messages to your care team and make appointments. If you have questions, please call your primary care clinic.  If you do not have a primary care provider, please call [phone] and they will assist you.        Care EveryWhere ID     This is your Care EveryWhere ID. This could be used by other organizations to access your  [place] medical records  [id number]        Your Vitals Were     Pulse Temperature Pulse Oximetry BMI (Body Mass Index)          87 97.7  F (36.5  C) (Oral) 98% 25.51 kg/m2         Blood Pressure from Last 3 Encounters:   06/30/17 118/76   04/03/17 125/72   03/16/17 113/65    Weight from Last 3 Encounters:   06/30/17 200 lb (90.7 kg)   04/03/17 195 lb (88.5 kg)   03/16/17 193 lb (87.5 kg)              We Performed the Following     [facility] PT, HAND, AND CHIROPRACTIC REFERRAL     ORTHO  REFERRAL          Today's Medication Changes          These changes are accurate as of: 6/30/17  8:24 AM.  If you have any questions, ask your nurse or doctor.               These medicines have changed or have updated prescriptions.        Dose/Directions    * methylphenidate 30 MG CR capsule   Commonly known as:  METADATE CD   This may have changed:  Another medication with the same name was added. Make sure you understand how and when to take each.   Used for:  Attention deficit disorder   Changed by:  Moose Pasron MD        Dose:  30 mg   Take 1 capsule (30 mg) by mouth every morning On work days.   Quantity:  30 capsule   Refills:  0       * methylphenidate 20 MG CR capsule   Commonly known as:  METADATE CD   This may have changed:  Another medication with the same name was added. Make sure you understand how and when to take each.   Used for:  Attention deficit disorder   Changed by:  Moose Parson MD        Dose:  20 mg   Take 1 capsule (20 mg) by mouth daily 1 capsule at night   Quantity:  30 capsule   Refills:  0       * methylphenidate 30 MG CR capsule   Commonly known as:  METADATE CD   This may have changed:  Another medication with the same name was added. Make sure you understand how and when to take each.   Used for:  Attention deficit disorder   Changed by:  Moose Parson MD        Dose:  30 mg   Take 1 capsule (30 mg) by mouth every morning On work days.   Quantity:  30 capsule    Refills:  0       * methylphenidate 20 MG CR capsule   Commonly known as:  METADATE CD   This may have changed:  Another medication with the same name was added. Make sure you understand how and when to take each.   Used for:  Attention deficit disorder   Changed by:  Moose Parson MD        Dose:  20 mg   Take 1 capsule (20 mg) by mouth daily 1 capsule at night   Quantity:  30 capsule   Refills:  0       * methylphenidate 30 MG CR capsule   Commonly known as:  METADATE CD   This may have changed:  You were already taking a medication with the same name, and this prescription was added. Make sure you understand how and when to take each.   Used for:  Attention deficit disorder (ADD) without hyperactivity   Changed by:  Kathy Shrestha APRN CNP        Dose:  30 mg   Take 1 capsule (30 mg) by mouth daily   Quantity:  30 capsule   Refills:  0       * methylphenidate 20 MG CR capsule   Commonly known as:  METADATE CD   This may have changed:  You were already taking a medication with the same name, and this prescription was added. Make sure you understand how and when to take each.   Used for:  Attention deficit disorder (ADD) without hyperactivity   Changed by:  Kathy Shrestha APRN CNP        Dose:  20 mg   Take 1 capsule (20 mg) by mouth daily   Quantity:  30 capsule   Refills:  0       * methylphenidate 30 MG CR capsule   Commonly known as:  METADATE CD   This may have changed:  You were already taking a medication with the same name, and this prescription was added. Make sure you understand how and when to take each.   Used for:  Attention deficit disorder (ADD) without hyperactivity   Changed by:  Kathy Shrestha APRN CNP        Dose:  30 mg   Start taking on:  7/31/2017   Take 1 capsule (30 mg) by mouth daily   Quantity:  30 capsule   Refills:  0       * methylphenidate 20 MG CR capsule   Commonly known as:  METADATE CD   This may have changed:  You were already taking a  medication with the same name, and this prescription was added. Make sure you understand how and when to take each.   Used for:  Attention deficit disorder (ADD) without hyperactivity   Changed by:  Kathy Shrestha APRN CNP        Dose:  20 mg   Start taking on:  7/31/2017   Take 1 capsule (20 mg) by mouth daily   Quantity:  30 capsule   Refills:  0       * methylphenidate 30 MG CR capsule   Commonly known as:  METADATE CD   This may have changed:  You were already taking a medication with the same name, and this prescription was added. Make sure you understand how and when to take each.   Used for:  Attention deficit disorder (ADD) without hyperactivity   Changed by:  Kathy Shrestha APRN CNP        Dose:  30 mg   Start taking on:  8/31/2017   Take 1 capsule (30 mg) by mouth daily   Quantity:  30 capsule   Refills:  0       * methylphenidate 20 MG CR capsule   Commonly known as:  METADATE CD   This may have changed:  You were already taking a medication with the same name, and this prescription was added. Make sure you understand how and when to take each.   Used for:  Attention deficit disorder (ADD) without hyperactivity   Changed by:  Kathy Shrestha APRN CNP        Dose:  20 mg   Start taking on:  8/31/2017   Take 1 capsule (20 mg) by mouth daily   Quantity:  30 capsule   Refills:  0       * Notice:  This list has 10 medication(s) that are the same as other medications prescribed for you. Read the directions carefully, and ask your doctor or other care provider to review them with you.         Where to get your medicines      Some of these will need a paper prescription and others can be bought over the counter.  Ask your nurse if you have questions.     Bring a paper prescription for each of these medications     methylphenidate 20 MG CR capsule    methylphenidate 20 MG CR capsule    methylphenidate 20 MG CR capsule    methylphenidate 30 MG CR capsule    methylphenidate 30 MG CR capsule     methylphenidate 30 MG CR capsule    sildenafil 100 MG cap/tab                Primary Care Provider Office Phone # Fax #    Moose UNDERWOOD MD Eb [phone]       [facility] [address]  [facility] [address]        Equal Access to Services     GOMEZ ERIC : Hadii aad ku hadasho Soomaali, waaxda luqadaha, qaybta kaalmada adeegyada, waxay radhain hayaan adeeg alexanderedamitchell laermelinda castillo. So [facility] [phone].    ATENCIÓN: Si habla español, tiene a brady disposición servicios gratuitos de asistencia lingüística. Llame al [phone].    We comply with applicable federal civil rights laws and [place] laws. We do not discriminate on the basis of race, color, national origin, age, disability sex, sexual orientation or gender identity.            Thank you!     Thank you for choosing [facility]  for your care. Our goal is always to provide you with excellent care. Hearing back from our patients is one way we can continue to improve our services. Please take a few minutes to complete the written survey that you may receive in the mail after your visit with us. Thank you!             Your Updated Medication List - Protect others around you: Learn how to safely use, store and throw away your medicines at www.disposemymeds.org.          This list is accurate as of: 6/30/17  8:24 AM.  Always use your most recent med list.                   Brand Name Dispense Instructions for use Diagnosis    * methylphenidate 30 MG CR capsule    METADATE CD    30 capsule    Take 1 capsule (30 mg) by mouth every morning On work days.    Attention deficit disorder       * methylphenidate 20 MG CR capsule    METADATE CD    30 capsule    Take 1 capsule (20 mg) by mouth daily 1 capsule at night    Attention deficit disorder       * methylphenidate 30 MG CR capsule    METADATE CD    30 capsule    Take 1 capsule (30 mg) by mouth every morning On work days.    Attention deficit disorder       *  methylphenidate 20 MG CR capsule    METADATE CD    30 capsule    Take 1 capsule (20 mg) by mouth daily 1 capsule at night    Attention deficit disorder       * methylphenidate 30 MG CR capsule    METADATE CD    30 capsule    Take 1 capsule (30 mg) by mouth daily    Attention deficit disorder (ADD) without hyperactivity       * methylphenidate 20 MG CR capsule    METADATE CD    30 capsule    Take 1 capsule (20 mg) by mouth daily    Attention deficit disorder (ADD) without hyperactivity       * methylphenidate 30 MG CR capsule   Start taking on:  7/31/2017    METADATE CD    30 capsule    Take 1 capsule (30 mg) by mouth daily    Attention deficit disorder (ADD) without hyperactivity       * methylphenidate 20 MG CR capsule   Start taking on:  7/31/2017    METADATE CD    30 capsule    Take 1 capsule (20 mg) by mouth daily    Attention deficit disorder (ADD) without hyperactivity       * methylphenidate 30 MG CR capsule   Start taking on:  8/31/2017    METADATE CD    30 capsule    Take 1 capsule (30 mg) by mouth daily    Attention deficit disorder (ADD) without hyperactivity       * methylphenidate 20 MG CR capsule   Start taking on:  8/31/2017    METADATE CD    30 capsule    Take 1 capsule (20 mg) by mouth daily    Attention deficit disorder (ADD) without hyperactivity       sildenafil 100 MG cap/tab    VIAGRA    6 tablet    Take 0.5-1 tablets ( mg) by mouth daily as needed for erectile dysfunction Take 30 min to 4 hours before intercourse.  Never use with nitroglycerin, terazosin or doxazosin.    Other male erectile dysfunction       * Notice:  This list has 10 medication(s) that are the same as other medications prescribed for you. Read the directions carefully, and ask your doctor or other care provider to review them with you.

## 2017-06-30 NOTE — PROGRESS NOTES
SUBJECTIVE:                                                    Tommy Best is a 37 year old male who presents to clinic today for the following health issues:      Medication Followup of Methylphenidate and Sildenafil    Taking Medication as prescribed: yes    Side Effects:  None    Medication Helping Symptoms:  yes     Reports being on med for 10 years  On current dosage for 6 years  Denies side effects: tremor, palpitations, anxiety, insomnia    Complains of neck pain  Started back in 2013 when he was doing a lot of weight lifting  Flared up 1 year ago r/t poor posture and a lot of reading at work and poor positioning  Saw chiropractor. Didn't help  Xrays done  Pain associated with bilateral shoulder tenderness  Shoulder pain improved with visits to chiropractor  Denies paresthesias  Does not take any medications for the pain  Denies headache, vision change, dizziness    Requests referral to podiatry  Is a runner, has IT band syndrome  A friend got orthotics and her knee pain resolved  He is interested in orthotics      Problem list and histories reviewed & adjusted, as indicated.  Additional history: none    Patient Active Problem List   Diagnosis     Attention deficit disorder     Photosensitivity     Hyperlipidemia LDL goal <160     Past Surgical History:   Procedure Laterality Date     NO HISTORY OF SURGERY         Social History   Substance Use Topics     Smoking status: Never Smoker     Smokeless tobacco: Never Used     Alcohol use Yes     Family History   Problem Relation Age of Onset     Family History Negative Mother      Alcohol/Drug Father      alcholol     DIABETES Father      Type 2     Chemical Addiction Father      Alcoholic     Hypertension Father      Anxiety Disorder Father      Substance Abuse Father      Thyroid Disease Father      Obesity Father      DIABETES Maternal Grandfather      Type 1     DIABETES Paternal Grandmother      Type 2     Cancer - colorectal Paternal Grandfather       Age 60     Hypertension Paternal Grandfather      Colon Cancer Paternal Grandfather            Reviewed and updated as needed this visit by clinical staff  Tobacco  Allergies  Meds  Med Hx  Surg Hx  Fam Hx  Soc Hx      Reviewed and updated as needed this visit by Provider         ROS:  Constitutional, HEENT, cardiovascular, pulmonary, gi and gu systems are negative, except as otherwise noted.    OBJECTIVE:     /76 (BP Location: Right arm, Patient Position: Chair, Cuff Size: Adult Large)  Pulse 87  Temp 97.7  F (36.5  C) (Oral)  Wt 200 lb (90.7 kg)  SpO2 98%  BMI 25.51 kg/m2  Body mass index is 25.51 kg/(m^2).  GENERAL: healthy, alert and no distress  RESP: lungs clear to auscultation - no rales, rhonchi or wheezes  CV: regular rate and rhythm, normal S1 S2, no S3 or S4, no murmur, click or rub, no peripheral edema and peripheral pulses strong  MS: No pain to palpation cervical spine. Tenderness to palpation cervical spinous processes (L>R). Mild tenderness with lateral rotation cervical spine. Negative spurling  SKIN: no suspicious lesions or rashes  NEURO: Normal strength and tone, mentation intact and speech normal  PSYCH: mentation appears normal, affect normal/bright    Diagnostic Test Results:  none     ASSESSMENT/PLAN:       ICD-10-CM    1. Attention deficit disorder (ADD) without hyperactivity F98.8 methylphenidate (METADATE CD) 30 MG CR capsule     methylphenidate (METADATE CD) 30 MG CR capsule     methylphenidate (METADATE CD) 30 MG CR capsule     methylphenidate (METADATE CD) 20 MG CR capsule     methylphenidate (METADATE CD) 20 MG CR capsule     methylphenidate (METADATE CD) 20 MG CR capsule   2. Cervicalgia M54.2 [facility] PT, HAND, AND CHIROPRACTIC REFERRAL   3. ITB syndrome M76.30 ORTHO  REFERRAL   4. Other male erectile dysfunction N52.8 sildenafil (VIAGRA) 100 MG cap/tab       Meds refilled. F/U with primary care provider in 3 months  Discussed conservative treatment measures.  Recommend physical therapy. If pain not improved, consider imaging    NITA Lewis CNP  [facility]

## 2017-07-13 ENCOUNTER — THERAPY VISIT (OUTPATIENT)
Dept: PHYSICAL THERAPY | Facility: CLINIC | Age: 37
End: 2017-07-13
Payer: COMMERCIAL

## 2017-07-13 DIAGNOSIS — M54.2 NECK PAIN: Primary | ICD-10-CM

## 2017-07-13 PROCEDURE — 97161 PT EVAL LOW COMPLEX 20 MIN: CPT | Mod: GP | Performed by: PHYSICAL THERAPIST

## 2017-07-13 PROCEDURE — 97140 MANUAL THERAPY 1/> REGIONS: CPT | Mod: GP | Performed by: PHYSICAL THERAPIST

## 2017-07-13 PROCEDURE — 97110 THERAPEUTIC EXERCISES: CPT | Mod: GP | Performed by: PHYSICAL THERAPIST

## 2017-07-13 NOTE — MR AVS SNAPSHOT
After Visit Summary   7/13/2017    Tommy Best    MRN: [id number]           Patient Information     Date Of Birth          1980        Visit Information        Provider Department      7/13/2017 8:10 AM Kareem Barton, PT [facility]        Today's Diagnoses     Neck pain    -  1       Follow-ups after your visit        Your next 10 appointments already scheduled     Jul 14, 2017  8:15 AM CDT   New Visit with Umesh Durbin DPM   [facility]    [address] [facility] [address] [phone]            Jul 26, 2017  8:20 AM CDT   LUNA Spine with Kareem Barton PT   [facility] ([facility])    [address] [facility] [address] [phone]              Who to contact     If you have questions or need follow up information about today's clinic visit or your schedule please contact [facility] directly at [phone].  Normal or non-critical lab and imaging results will be communicated to you by [employer], letter or phone within 4 business days after the clinic has received the results. If you do not hear from us within 7 days, please contact the clinic through [employer] or phone. If you have a critical or abnormal lab result, we will notify you by phone as soon as possible.  Submit refill requests through [employer] or call your pharmacy and they will forward the refill request to us. Please allow 3 business days for your refill to be completed.          Additional Information About Your Visit        [employer] Information     [employer] gives you secure access to your electronic health record. If you see a primary care provider, you can also send messages to your care team and make appointments. If you have questions, please call your primary care clinic.  If you do not  have a primary care provider, please call [phone] and they will assist you.        Care EveryWhere ID     This is your Care EveryWhere ID. This could be used by other organizations to access your [place] medical records  [id number]         Blood Pressure from Last 3 Encounters:   06/30/17 118/76   04/03/17 125/72   03/16/17 113/65    Weight from Last 3 Encounters:   06/30/17 90.7 kg (200 lb)   04/03/17 88.5 kg (195 lb)   03/16/17 87.5 kg (193 lb)              We Performed the Following     HC PT EVAL, LOW COMPLEXITY     LUNA INITIAL EVAL REPORT     MANUAL THER TECH,1+REGIONS,EA 15 MIN     THERAPEUTIC EXERCISES        Primary Care Provider Office Phone # Fax #    Moose Parson [phone]       [facility] [address]  [place] [address]        Equal Access to Services     GOMEZ ERIC : Hadii aad ku hadasho Soomaali, waaxda luqadaha, qaybta kaalmada adeegyada, waxay idiin hayaan adeeg vaibhav mccracken . So [facility] [phone].    ATENCIÓN: Si habla español, tiene a brady disposición servicios gratuitos de asistencia lingüística. Llame al [phone].    We comply with applicable federal civil rights laws and [place] laws. We do not discriminate on the basis of race, color, national origin, age, disability sex, sexual orientation or gender identity.            Thank you!     Thank you for choosing [facility]  for your care. Our goal is always to provide you with excellent care. Hearing back from our patients is one way we can continue to improve our services. Please take a few minutes to complete the written survey that you may receive in the mail after your visit with us. Thank you!             Your Updated Medication List - Protect others around you: Learn how to safely use, store and throw away your medicines at www.disposemymeds.org.          This list is accurate as of: 7/13/17  9:36 AM.  Always use your most recent  med list.                   Brand Name Dispense Instructions for use Diagnosis    * methylphenidate 30 MG CR capsule    METADATE CD    30 capsule    Take 1 capsule (30 mg) by mouth every morning On work days.    Attention deficit disorder       * methylphenidate 20 MG CR capsule    METADATE CD    30 capsule    Take 1 capsule (20 mg) by mouth daily 1 capsule at night    Attention deficit disorder       * methylphenidate 30 MG CR capsule    METADATE CD    30 capsule    Take 1 capsule (30 mg) by mouth every morning On work days.    Attention deficit disorder       * methylphenidate 20 MG CR capsule    METADATE CD    30 capsule    Take 1 capsule (20 mg) by mouth daily 1 capsule at night    Attention deficit disorder       * methylphenidate 30 MG CR capsule    METADATE CD    30 capsule    Take 1 capsule (30 mg) by mouth daily    Attention deficit disorder (ADD) without hyperactivity       * methylphenidate 20 MG CR capsule    METADATE CD    30 capsule    Take 1 capsule (20 mg) by mouth daily    Attention deficit disorder (ADD) without hyperactivity       * methylphenidate 30 MG CR capsule   Start taking on:  7/31/2017    METADATE CD    30 capsule    Take 1 capsule (30 mg) by mouth daily    Attention deficit disorder (ADD) without hyperactivity       * methylphenidate 20 MG CR capsule   Start taking on:  7/31/2017    METADATE CD    30 capsule    Take 1 capsule (20 mg) by mouth daily    Attention deficit disorder (ADD) without hyperactivity       * methylphenidate 30 MG CR capsule   Start taking on:  8/31/2017    METADATE CD    30 capsule    Take 1 capsule (30 mg) by mouth daily    Attention deficit disorder (ADD) without hyperactivity       * methylphenidate 20 MG CR capsule   Start taking on:  8/31/2017    METADATE CD    30 capsule    Take 1 capsule (20 mg) by mouth daily    Attention deficit disorder (ADD) without hyperactivity       sildenafil 100 MG cap/tab    VIAGRA    6 tablet    Take 0.5-1 tablets ( mg) by  mouth daily as needed for erectile dysfunction Take 30 min to 4 hours before intercourse.  Never use with nitroglycerin, terazosin or doxazosin.    Other male erectile dysfunction       * Notice:  This list has 10 medication(s) that are the same as other medications prescribed for you. Read the directions carefully, and ask your doctor or other care provider to review them with you.

## 2017-07-13 NOTE — PROGRESS NOTES
[facility] Initial Evaluation    Subjective:    Patient is a 37 year old male presenting with rehab cervical spine hpi. The history is provided by the patient.   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  Condition occurred with:  Insidious onset.  Condition occurred: for unknown reasons.  This is a chronic condition  MD referral 6/30/2017.  Pain increased about 1 year ago.  Possibly due to carrying/ feeding baby or increased reading/ work load.  .    Patient reports pain:  Cervical left side, upper thoracic, mid cervical spine and lower cervical spine.  Radiates to:  Shoulder left and shoulder right.  Pain is described as aching (stiffness) and is constant (in neck ) and reported as 4/10.  Associated symptoms:  Headache and loss of motion/stiffness. Pain is worse during the day.  Symptoms are exacerbated by lifting, driving and certain positions (golf) Relieved by: cracking neck.  Since onset symptoms are unchanged.  Special tests:  X-ray (chiro - decreased curve).  Previous treatment includes chiropractic.  There was mild improvement following previous treatment.  General health as reported by patient is good.  Past medical history: ADD; light sensitivity;  dyslexia.  Medical allergies: no.  Other surgeries include:  No.  Current medications:  Other (methylphenadate).  Current occupation .  Patient is working in normal job without restrictions.  Primary job tasks include:  Prolonged sitting.    Barriers include:  None as reported by the patient.    Red flags:  None as reported by the patient.                        Objective:    Standing Alignment:    Cervical/Thoracic:  Normal                                    Cervical/Thoracic Evaluation  Cervical AROM: normal     AROM:  AROM Cervical:    Flexion:          End range sx  Extension:       Full  Rotation:         Left: end range sx     Right: end range sx  Side Bend:      Left:     Right:        Headaches: cervical (occasionally)  Cervical Myotomes:  normal                        Cervical Palpation:    Tenderness present at Left:    Rhomboids; Upper Trap; Erector Spinae and Suboccipitals  Tenderness present at Right:    Upper Trap; Erector Spinae and Suboccipitals      Spinal Segmental Conclusions:    Level:  Hypo at C6, C5, C4, C3, C7 and T1                                                General     ROS    Assessment/Plan:      Patient is a 37 year old male with cervical complaints.    Patient has the following significant findings with corresponding treatment plan.                Diagnosis 1:  Neck pain  Pain -  US, electric stimulation, manual therapy, self management, education, directional preference exercise and home program  Decreased ROM/flexibility - manual therapy and therapeutic exercise  Decreased function - therapeutic activities    Therapy Evaluation Codes:   1) History comprised of:   Personal factors that impact the plan of care:      None.    Comorbidity factors that impact the plan of care are:      None.     Medications impacting care: None.  2) Examination of Body Systems comprised of:   Body structures and functions that impact the plan of care:      Cervical spine.   Activity limitations that impact the plan of care are:      Driving and Reading/Computer work.  3) Clinical presentation characteristics are:   Stable/Uncomplicated.  4) Decision-Making    Low complexity using standardized patient assessment instrument and/or measureable assessment of functional outcome.  Cumulative Therapy Evaluation is: Low complexity.    Previous and current functional limitations:  (See Goal Flow Sheet for this information)    Short term and Long term goals: (See Goal Flow Sheet for this information)     Communication ability:  Patient appears to be able to clearly communicate and understand verbal and written communication and follow directions correctly.  Treatment Explanation - The following  has been discussed with the patient:   RX ordered/plan of care  Anticipated outcomes  Possible risks and side effects  This patient would benefit from PT intervention to resume normal activities.   Rehab potential is good.    Frequency:  2 X a month, once daily  Duration:  for 2 months  Discharge Plan:  Achieve all LTG.  Independent in home treatment program.  Reach maximal therapeutic benefit.    Please refer to the daily flowsheet for treatment today, total treatment time and time spent performing 1:1 timed codes.

## 2017-07-14 ENCOUNTER — OFFICE VISIT (OUTPATIENT)
Dept: PODIATRY | Facility: CLINIC | Age: 37
End: 2017-07-14
Payer: COMMERCIAL

## 2017-07-14 VITALS — WEIGHT: 200 LBS | HEART RATE: 90 BPM | BODY MASS INDEX: 25.51 KG/M2 | OXYGEN SATURATION: 99 %

## 2017-07-14 DIAGNOSIS — M21.869 TIBIAL TORSION: ICD-10-CM

## 2017-07-14 DIAGNOSIS — M76.30 IT BAND SYNDROME, UNSPECIFIED LATERALITY: ICD-10-CM

## 2017-07-14 DIAGNOSIS — M21.6X2 PRONATION DEFORMITY OF ANKLE, ACQUIRED, LEFT: Primary | ICD-10-CM

## 2017-07-14 PROCEDURE — 99243 OFF/OP CNSLTJ NEW/EST LOW 30: CPT | Performed by: PODIATRIST

## 2017-07-14 NOTE — NURSING NOTE
"Chief Complaint   Patient presents with     Foot Problems     has IT ban syndrom looking for orthotics, pt is a Runner       Initial Pulse 90  Wt 90.7 kg (200 lb)  SpO2 99%  BMI 25.51 kg/m2 Estimated body mass index is 25.51 kg/(m^2) as calculated from the following:    Height as of 3/16/17: 1.886 m (6' 2.25\").    Weight as of this encounter: 90.7 kg (200 lb).  Medication Reconciliation: complete  "

## 2017-07-14 NOTE — MR AVS SNAPSHOT
After Visit Summary   7/14/2017    Tommy Best    MRN: [id number]           Patient Information     Date Of Birth          1980        Visit Information        Provider Department      7/14/2017 8:15 AM Umesh Durbin, ANGIE [facility]        Today's Diagnoses     Pronation deformity of ankle, acquired, left    -  1    Tibial torsion        IT band syndrome, unspecified laterality          Care Instructions    We wish you continued good healing. If you have any questions or concerns, please do not hesitate to contact us at [phone]      Please remember to call and schedule a follow up appointment if one was recommended at your earliest convenience.   PODIATRY CLINIC HOURS  TELEPHONE NUMBER    Dr. Umesh SHAHPDANNA [facility]    [facility]:  [facility]  [facility]        Cristela Rossi MA  Medical Assistant  Tuesday 1PM-6PM  [place]  Wednesday 7AM-2PM  [place]  Thursday 10AM-6PM  [place]  Friday 7AM-345PM  [place]  Specialty schedulers:   [phone] to make an appointment with any Specialty Provider.        Urgent Care locations:    [facility] Monday-Friday 5 pm - 9 pm. Saturday-Sunday 9 am -5pm    Monday-Friday 11 am - 9 pm Saturday 9 am - 5 pm     Monday-Sunday 12 noon-8PM [phone]     If you need a medication refill, please contact us you may need lab work and/or a follow up visit prior to your refill (i.e. Antifungal medications).    [employer] (secure e-mail communication and access to your chart) to send a message or to make an appointment.    If MRI needed please call Sergio Dennis at [phone]        Weight management plan: Patient was referred to their PCP to discuss a diet and exercise plan.            Follow-ups after your visit        Your next 10 appointments already scheduled     Jul 26, 2017  8:20 AM ADRIAN CARRASCO  Spine with Kareem Barton PT   [facility]    [address]  [place] \[facility].\"" [address] [phone]              Who to contact     If you have questions or need follow up information about today's clinic visit or your schedule please contact [facility] directly at [phone].  Normal or non-critical lab and imaging results will be communicated to you by MyChart, letter or phone within 4 business days after the clinic has received the results. If you do not hear from us within 7 days, please contact the clinic through [employer] or phone. If you have a critical or abnormal lab result, we will notify you by phone as soon as possible.  Submit refill requests through [email or web address] or call your pharmacy and they will forward the refill request to us. Please allow 3 business days for your refill to be completed.          Additional Information About Your Visit        [email or web address] Information     [email or web address] gives you secure access to your electronic health record. If you see a primary care provider, you can also send messages to your care team and make appointments. If you have questions, please call your primary care clinic.  If you do not have a primary care provider, please call [phone] and they will assist you.        Care EveryWhere ID     This is your Care EveryWhere ID. This could be used by other organizations to access your [place] medical records  [id number]        Your Vitals Were     Pulse Pulse Oximetry BMI (Body Mass Index)             90 99% 25.51 kg/m2          Blood Pressure from Last 3 Encounters:   06/30/17 118/76   04/03/17 125/72   03/16/17 113/65    Weight from Last 3 Encounters:   07/14/17 90.7 kg (200 lb)   06/30/17 90.7 kg (200 lb)   04/03/17 88.5 kg (195 lb)              Today, you had the following     No orders found for display       Primary Care Provider Office Phone # Fax #    Moose Parson MD  [phone]       [facility] [address]  [facility] [address]        Equal Access to Services     GOMEZ ERIC : Hadii aad ku hadavmary [place]ear[place], washahidda luqadaha, qabenjamínta kaalmada cristopher, neel armendarizkwan jonathan. So [facility] [phone].    ATENCIÓN: Si habla español, tiene a brady disposición servicios gratuitos de asistencia lingüística. Llame al [phone].    We comply with applicable federal civil rights laws and [place] laws. We do not discriminate on the basis of race, color, national origin, age, disability sex, sexual orientation or gender identity.            Thank you!     Thank you for choosing [facility]  for your care. Our goal is always to provide you with excellent care. Hearing back from our patients is one way we can continue to improve our services. Please take a few minutes to complete the written survey that you may receive in the mail after your visit with us. Thank you!             Your Updated Medication List - Protect others around you: Learn how to safely use, store and throw away your medicines at www.disposemymeds.org.          This list is accurate as of: 7/14/17  2:35 PM.  Always use your most recent med list.                   Brand Name Dispense Instructions for use Diagnosis    * methylphenidate 30 MG CR capsule    METADATE CD    30 capsule    Take 1 capsule (30 mg) by mouth daily    Attention deficit disorder (ADD) without hyperactivity       * methylphenidate 20 MG CR capsule    METADATE CD    30 capsule    Take 1 capsule (20 mg) by mouth daily    Attention deficit disorder (ADD) without hyperactivity       * methylphenidate 30 MG CR capsule   Start taking on:  7/31/2017    METADATE CD    30 capsule    Take 1 capsule (30 mg) by mouth daily    Attention deficit disorder (ADD) without hyperactivity       * methylphenidate 20 MG CR capsule   Start taking on:  7/31/2017    METADATE CD    30 capsule     Take 1 capsule (20 mg) by mouth daily    Attention deficit disorder (ADD) without hyperactivity       * methylphenidate 30 MG CR capsule   Start taking on:  8/31/2017    METADATE CD    30 capsule    Take 1 capsule (30 mg) by mouth daily    Attention deficit disorder (ADD) without hyperactivity       * methylphenidate 20 MG CR capsule   Start taking on:  8/31/2017    METADATE CD    30 capsule    Take 1 capsule (20 mg) by mouth daily    Attention deficit disorder (ADD) without hyperactivity       sildenafil 100 MG cap/tab    VIAGRA    6 tablet    Take 0.5-1 tablets ( mg) by mouth daily as needed for erectile dysfunction Take 30 min to 4 hours before intercourse.  Never use with nitroglycerin, terazosin or doxazosin.    Other male erectile dysfunction       * Notice:  This list has 6 medication(s) that are the same as other medications prescribed for you. Read the directions carefully, and ask your doctor or other care provider to review them with you.

## 2017-07-14 NOTE — PROGRESS NOTES
S:  Patient seen in consult from Kathy Shrestha and complains of bilateral lower extremity pain.  States he has run in the past but had to stop because of IT band syndrome.  States his knees have pain at times.  Aggravated by activity and relieved by rest.   At the time he was running he was wearing a minimalist shoe.   Better now that he is not running.  no pain walking.  denies weakness.  denies drop in arch.  deniesedema.  denies ecchymosis.    ROS:  See above       Allergies   Allergen Reactions     Seasonal Allergies        Current Outpatient Prescriptions   Medication Sig Dispense Refill     methylphenidate (METADATE CD) 30 MG CR capsule Take 1 capsule (30 mg) by mouth daily 30 capsule 0     methylphenidate (METADATE CD) 20 MG CR capsule Take 1 capsule (20 mg) by mouth daily 30 capsule 0     sildenafil (VIAGRA) 100 MG cap/tab Take 0.5-1 tablets ( mg) by mouth daily as needed for erectile dysfunction Take 30 min to 4 hours before intercourse.  Never use with nitroglycerin, terazosin or doxazosin. 6 tablet 11     [START ON 7/31/2017] methylphenidate (METADATE CD) 30 MG CR capsule Take 1 capsule (30 mg) by mouth daily 30 capsule 0     [START ON 8/31/2017] methylphenidate (METADATE CD) 30 MG CR capsule Take 1 capsule (30 mg) by mouth daily 30 capsule 0     [START ON 7/31/2017] methylphenidate (METADATE CD) 20 MG CR capsule Take 1 capsule (20 mg) by mouth daily 30 capsule 0     [START ON 8/31/2017] methylphenidate (METADATE CD) 20 MG CR capsule Take 1 capsule (20 mg) by mouth daily 30 capsule 0       Patient Active Problem List   Diagnosis     Attention deficit disorder     Photosensitivity     Hyperlipidemia LDL goal <160     Neck pain       Past Medical History:   Diagnosis Date     Attention deficit disorder without mention of hyperactivity 9/05     Depressive disorder 2008    Brief, and ADHD-related       Past Surgical History:   Procedure Laterality Date     NO HISTORY OF SURGERY         Family History    Problem Relation Age of Onset     Family History Negative Mother      Alcohol/Drug Father      alcholol     DIABETES Father      Type 2     Chemical Addiction Father      Alcoholic     Hypertension Father      Anxiety Disorder Father      Substance Abuse Father      Thyroid Disease Father      Obesity Father      DIABETES Maternal Grandfather      Type 1     DIABETES Paternal Grandmother      Type 2     Cancer - colorectal Paternal Grandfather      Age 60     Hypertension Paternal Grandfather      Colon Cancer Paternal Grandfather        Social History   Substance Use Topics     Smoking status: Never Smoker     Smokeless tobacco: Never Used     Alcohol use Yes         O:  Pulse 90  Wt 90.7 kg (200 lb)  SpO2 99%  BMI 25.51 kg/m2.  Good historian.  A&O X 3.  Pulses DP, PT 2/4 b/l.  CRT < 3 seconds X 10 digits.  No edema or varicosities noted.  Sensation to light touch intact b/l.  Reflexes 2/4 b/l.  Skin has normal texture and turgor b/l.  No forefoot or rear foot deformities noted.  MS 5/5 all compartments.  Normal ROM all fore foot and rearfoot joints.  No equinus.  With weightbearing patient has normal arch on right and left has mild pronation.  bilateral internal tibial torsion left>R.  No pain with ROM or palpation anywhere on feet.   No pain with stressing any tendons.  Good calcaneal iversion with foot flexion.   negative edema.  negative ecchymosis.   negative masses noted.  X-rays normal.    A:  Internal tibial torsion bilateral  left>R       Left pronation       IT band syndrome    P:  Discussed with  Patient has bilateral internal tibial torsion with left>R.  Explained how increase in left internal tibial torsion causing left pronation.  Should  not use minimalist shoes with this as putting to much stress on lower extremity and he needs more support.  Made suggestions on good running shoes.   RX for custom orthotics.  Discussed importance of wearing these in a good shoe at all times to prevent future  problems.  Continue IT band therapy.   RETURN TO CLINIC PRN.  Thank you for allowing me participate in the care of this patient.        Umesh Durbin DPM, FACFAS

## 2017-07-14 NOTE — PATIENT INSTRUCTIONS
We wish you continued good healing. If you have any questions or concerns, please do not hesitate to contact us at [phone]      Please remember to call and schedule a follow up appointment if one was recommended at your earliest convenience.   PODIATRY CLINIC HOURS  TELEPHONE NUMBER    Dr. Umesh Durbin D.P.M [facility] [place]    Clinics:  [facility]        Cristela Rossi MA  Medical Assistant  Tuesday 1PM-6PM  [facility]  Wednesday 7AM-2PM  [place]  Thursday 10AM-6PM  [place] Friday 7AM-345PM  [place]  Specialty schedulers:   [phone] to make an appointment with any Specialty Provider.        Urgent Care locations:    [facility] Monday-Friday 5 pm - 9 pm. Saturday-Sunday 9 am -5pm    Monday-Friday 11 am - 9 pm Saturday 9 am - 5 pm     Monday-Sunday 12 noon-8PM [phone]     If you need a medication refill, please contact us you may need lab work and/or a follow up visit prior to your refill (i.e. Antifungal medications).    [facility] (secure e-mail communication and access to your chart) to send a message or to make an appointment.    If MRI needed please call Sergio Denins at [phone]        Weight management plan: Patient was referred to their PCP to discuss a diet and exercise plan.     n/a

## 2017-07-26 ENCOUNTER — THERAPY VISIT (OUTPATIENT)
Dept: PHYSICAL THERAPY | Facility: CLINIC | Age: 37
End: 2017-07-26
Payer: COMMERCIAL

## 2017-07-26 DIAGNOSIS — M54.2 NECK PAIN: ICD-10-CM

## 2017-07-26 PROCEDURE — 97140 MANUAL THERAPY 1/> REGIONS: CPT | Mod: GP | Performed by: PHYSICAL THERAPIST

## 2017-07-26 PROCEDURE — 97112 NEUROMUSCULAR REEDUCATION: CPT | Mod: GP | Performed by: PHYSICAL THERAPIST

## 2017-07-26 PROCEDURE — 97110 THERAPEUTIC EXERCISES: CPT | Mod: GP | Performed by: PHYSICAL THERAPIST

## 2017-08-16 ENCOUNTER — THERAPY VISIT (OUTPATIENT)
Dept: PHYSICAL THERAPY | Facility: CLINIC | Age: 37
End: 2017-08-16
Payer: COMMERCIAL

## 2017-08-16 DIAGNOSIS — M54.2 NECK PAIN: ICD-10-CM

## 2017-08-16 PROCEDURE — 97112 NEUROMUSCULAR REEDUCATION: CPT | Mod: GP | Performed by: PHYSICAL THERAPIST

## 2017-08-16 PROCEDURE — 97140 MANUAL THERAPY 1/> REGIONS: CPT | Mod: GP | Performed by: PHYSICAL THERAPIST

## 2017-08-16 PROCEDURE — 97110 THERAPEUTIC EXERCISES: CPT | Mod: GP | Performed by: PHYSICAL THERAPIST

## 2017-08-16 NOTE — MR AVS SNAPSHOT
After Visit Summary   8/16/2017    Tommy Best    MRN: [id number]           Patient Information     Date Of Birth          1980        Visit Information        Provider Department      8/16/2017 8:20 AM Kareem Barton, PT [facility]        Today's Diagnoses     Neck pain           Follow-ups after your visit        Who to contact     If you have questions or need follow up information about today's clinic visit or your schedule please contact [facility] directly at [phone].  Normal or non-critical lab and imaging results will be communicated to you by Crowdboosterhart, letter or phone within 4 business days after the clinic has received the results. If you do not hear from us within 7 days, please contact the clinic through [employer] or phone. If you have a critical or abnormal lab result, we will notify you by phone as soon as possible.  Submit refill requests through [employer] or call your pharmacy and they will forward the refill request to us. Please allow 3 business days for your refill to be completed.          Additional Information About Your Visit        MyChart Information     [employer] gives you secure access to your electronic health record. If you see a primary care provider, you can also send messages to your care team and make appointments. If you have questions, please call your primary care clinic.  If you do not have a primary care provider, please call [phone] and they will assist you.        Care EveryWhere ID     This is your Care EveryWhere ID. This could be used by other organizations to access your [place] medical records  [id number]         Blood Pressure from Last 3 Encounters:   06/30/17 118/76   04/03/17 125/72   03/16/17 113/65    Weight from Last 3 Encounters:   07/14/17 90.7 kg (200 lb)   06/30/17 90.7 kg (200 lb)   04/03/17 88.5 kg (195 lb)              We Performed the Following      MANUAL THER TECH,1+REGIONS,EA 15 MIN     NEUROMUSCULAR RE-EDUCATION     THERAPEUTIC EXERCISES        Primary Care Provider Office Phone # Fax #    Moose Parson [phone]       [address] [place]. \Hospital Has a New Name and Outlook.\"" [id number]        Equal Access to Services     GOMEZ ERIC : Hadii aad ku hadasho Soomaali, waaxda luqadaha, qaybta kaalmada adeegyada, waxay radhain hayrenettan adesary munozedamitchell laermelinda castillo. So [facility] [phone].    ATENCIÓN: Si habla español, tiene a brady disposición servicios gratuitos de asistencia lingüística. Llame al [phone].    We comply with applicable federal civil rights laws and [place] laws. We do not discriminate on the basis of race, color, national origin, age, disability sex, sexual orientation or gender identity.            Thank you!     Thank you for choosing [facility]  for your care. Our goal is always to provide you with excellent care. Hearing back from our patients is one way we can continue to improve our services. Please take a few minutes to complete the written survey that you may receive in the mail after your visit with us. Thank you!             Your Updated Medication List - Protect others around you: Learn how to safely use, store and throw away your medicines at www.disposemymeds.org.          This list is accurate as of: 8/16/17  8:53 AM.  Always use your most recent med list.                   Brand Name Dispense Instructions for use Diagnosis    * methylphenidate 30 MG CR capsule    METADATE CD    30 capsule    Take 1 capsule (30 mg) by mouth daily    Attention deficit disorder (ADD) without hyperactivity       * methylphenidate 20 MG CR capsule    METADATE CD    30 capsule    Take 1 capsule (20 mg) by mouth daily    Attention deficit disorder (ADD) without hyperactivity       * methylphenidate 30 MG CR capsule   Start taking on:  8/31/2017    METADATE CD    30 capsule    Take 1 capsule (30 mg) by mouth  daily    Attention deficit disorder (ADD) without hyperactivity       * methylphenidate 20 MG CR capsule   Start taking on:  8/31/2017    METADATE CD    30 capsule    Take 1 capsule (20 mg) by mouth daily    Attention deficit disorder (ADD) without hyperactivity       sildenafil 100 MG cap/tab    VIAGRA    6 tablet    Take 0.5-1 tablets ( mg) by mouth daily as needed for erectile dysfunction Take 30 min to 4 hours before intercourse.  Never use with nitroglycerin, terazosin or doxazosin.    Other male erectile dysfunction       * Notice:  This list has 4 medication(s) that are the same as other medications prescribed for you. Read the directions carefully, and ask your doctor or other care provider to review them with you.

## 2017-09-15 ENCOUNTER — OFFICE VISIT (OUTPATIENT)
Dept: FAMILY MEDICINE | Facility: CLINIC | Age: 37
End: 2017-09-15
Payer: COMMERCIAL

## 2017-09-15 VITALS
DIASTOLIC BLOOD PRESSURE: 77 MMHG | SYSTOLIC BLOOD PRESSURE: 116 MMHG | BODY MASS INDEX: 24.74 KG/M2 | HEART RATE: 95 BPM | TEMPERATURE: 98.5 F | WEIGHT: 194 LBS | OXYGEN SATURATION: 97 %

## 2017-09-15 DIAGNOSIS — F98.8 ATTENTION DEFICIT DISORDER (ADD) WITHOUT HYPERACTIVITY: ICD-10-CM

## 2017-09-15 PROCEDURE — 99213 OFFICE O/P EST LOW 20 MIN: CPT | Performed by: FAMILY MEDICINE

## 2017-09-15 RX ORDER — METHYLPHENIDATE HYDROCHLORIDE 20 MG/1
20 CAPSULE, EXTENDED RELEASE ORAL DAILY
Qty: 30 CAPSULE | Refills: 0 | Status: SHIPPED | OUTPATIENT
Start: 2017-09-29 | End: 2018-03-05

## 2017-09-15 RX ORDER — METHYLPHENIDATE HYDROCHLORIDE 30 MG/1
30 CAPSULE, EXTENDED RELEASE ORAL DAILY
Qty: 30 CAPSULE | Refills: 0 | Status: SHIPPED | OUTPATIENT
Start: 2017-09-29 | End: 2018-06-14

## 2017-09-15 NOTE — MR AVS SNAPSHOT
After Visit Summary   9/15/2017    Tommy Best    MRN: [id number]           Patient Information     Date Of Birth          1980        Visit Information        Provider Department      9/15/2017 11:20 AM Pranav Coreas MD [facility]        Today's Diagnoses     Attention deficit disorder (ADD) without hyperactivity           Follow-ups after your visit        Follow-up notes from your care team     Return in about 5 weeks (around 10/20/2017) for follow up with PCP Dr. LEWIS      Who to contact     If you have questions or need follow up information about today's clinic visit or your schedule please contact [facility] directly at [phone].  Normal or non-critical lab and imaging results will be communicated to you by MyChart, letter or phone within 4 business days after the clinic has received the results. If you do not hear from us within 7 days, please contact the clinic through [employer] or phone. If you have a critical or abnormal lab result, we will notify you by phone as soon as possible.  Submit refill requests through [employer] or call your pharmacy and they will forward the refill request to us. Please allow 3 business days for your refill to be completed.          Additional Information About Your Visit        MyChart Information     [employer] gives you secure access to your electronic health record. If you see a primary care provider, you can also send messages to your care team and make appointments. If you have questions, please call your primary care clinic.  If you do not have a primary care provider, please call [phone] and they will assist you.        Care EveryWhere ID     This is your Care EveryWhere ID. This could be used by other organizations to access your [place] medical records  [id number]        Your Vitals Were     Pulse Temperature Pulse Oximetry BMI (Body Mass Index)          95 98.5  F (36.9  C) (Oral) 97% 24.74  kg/m2         Blood Pressure from Last 3 Encounters:   09/15/17 116/77   06/30/17 118/76   04/03/17 125/72    Weight from Last 3 Encounters:   09/15/17 194 lb (88 kg)   07/14/17 200 lb (90.7 kg)   06/30/17 200 lb (90.7 kg)              Today, you had the following     No orders found for display         Where to get your medicines      Some of these will need a paper prescription and others can be bought over the counter.  Ask your nurse if you have questions.     Bring a paper prescription for each of these medications     methylphenidate 20 MG CR capsule    methylphenidate 30 MG CR capsule          Primary Care Provider Office Phone # Fax #    Moose Parson [phone]       [address] [facility] [address]        Equal Access to Services     GOMEZ ERIC : Elif child Sodonnell, waaxda luqadaha, qaybta kaalmada cristopher, neel castillo. So [facility] [phone].    ATENCIÓN: Si habla español, tiene a brady disposición servicios gratuitos de asistencia lingüística. Praful al [phone].    We comply with applicable federal civil rights laws and [place] laws. We do not discriminate on the basis of race, color, national origin, age, disability sex, sexual orientation or gender identity.            Thank you!     Thank you for choosing [facility]  for your care. Our goal is always to provide you with excellent care. Hearing back from our patients is one way we can continue to improve our services. Please take a few minutes to complete the written survey that you may receive in the mail after your visit with us. Thank you!             Your Updated Medication List - Protect others around you: Learn how to safely use, store and throw away your medicines at www.disposemymeds.org.          This list is accurate as of: 9/15/17 11:57 AM.  Always use your most recent med list.                   Brand Name Dispense Instructions for  use Diagnosis    * methylphenidate 30 MG CR capsule   Start taking on:  9/29/2017    METADATE CD    30 capsule    Take 1 capsule (30 mg) by mouth daily    Attention deficit disorder (ADD) without hyperactivity       * methylphenidate 20 MG CR capsule   Start taking on:  9/29/2017    METADATE CD    30 capsule    Take 1 capsule (20 mg) by mouth daily    Attention deficit disorder (ADD) without hyperactivity       sildenafil 100 MG tablet    VIAGRA    6 tablet    Take 0.5-1 tablets ( mg) by mouth daily as needed for erectile dysfunction Take 30 min to 4 hours before intercourse.  Never use with nitroglycerin, terazosin or doxazosin.    Other male erectile dysfunction       * Notice:  This list has 2 medication(s) that are the same as other medications prescribed for you. Read the directions carefully, and ask your doctor or other care provider to review them with you.

## 2017-09-15 NOTE — NURSING NOTE
"Chief Complaint   Patient presents with     Recheck Medication     METADATE CD 30 MG       Initial /77 (BP Location: Right arm, Patient Position: Chair, Cuff Size: Adult Regular)  Pulse 95  Temp 98.5  F (36.9  C) (Oral)  Wt 194 lb (88 kg)  SpO2 97%  BMI 24.74 kg/m2 Estimated body mass index is 24.74 kg/(m^2) as calculated from the following:    Height as of 3/16/17: 6' 2.25\" (1.886 m).    Weight as of this encounter: 194 lb (88 kg).  Medication Reconciliation: complete   Mckenzie Concepcion CMA       "

## 2017-09-15 NOTE — PROGRESS NOTES
SUBJECTIVE:   Tommy Best is a 37 year old male who presents to clinic today for the following health issues:      Medication Followup of Metadate    Taking Medication as prescribed: yes    Side Effects:  None    Medication Helping Symptoms:  yes       The patient normally sees Dr. SILVEIRA for his primary care and for this condition. I had a late cancellation in my schedule and the patient was just put into my schedule in the last hour or so at this open slot. He is looking to get his Metadate refilled. He takes 30 mg in the morning and 20 mg in the afternoon for adult ADD. He works as a .  He finds the medication useful and the like to continue it. He normally gets 3 months worth of prescriptions at a time. He still has about 2 weeks worth of medicine yet from his previous visit with Kathy Shrestha.    I note that he is also on Viagra for erectile dysfunction. He is not a smoker. He has no diabetes.    Problem list and histories reviewed & adjusted, as indicated.  Additional history: as documented    Patient Active Problem List   Diagnosis     Attention deficit disorder     Photosensitivity     Hyperlipidemia LDL goal <160     Neck pain     Past Surgical History:   Procedure Laterality Date     NO HISTORY OF SURGERY         Social History   Substance Use Topics     Smoking status: Never Smoker     Smokeless tobacco: Never Used     Alcohol use Yes     Family History   Problem Relation Age of Onset     Family History Negative Mother      Alcohol/Drug Father      alcholol     DIABETES Father      Type 2     Chemical Addiction Father      Alcoholic     Hypertension Father      Anxiety Disorder Father      Substance Abuse Father      Thyroid Disease Father      Obesity Father      DIABETES Maternal Grandfather      Type 1     DIABETES Paternal Grandmother      Type 2     Cancer - colorectal Paternal Grandfather      Age 60     Hypertension Paternal Grandfather      Colon Cancer Paternal Grandfather           Current Outpatient Prescriptions   Medication Sig Dispense Refill     [START ON 9/29/2017] methylphenidate (METADATE CD) 30 MG CR capsule Take 1 capsule (30 mg) by mouth daily 30 capsule 0     [START ON 9/29/2017] methylphenidate (METADATE CD) 20 MG CR capsule Take 1 capsule (20 mg) by mouth daily 30 capsule 0     sildenafil (VIAGRA) 100 MG cap/tab Take 0.5-1 tablets ( mg) by mouth daily as needed for erectile dysfunction Take 30 min to 4 hours before intercourse.  Never use with nitroglycerin, terazosin or doxazosin. 6 tablet 11     Allergies   Allergen Reactions     Seasonal Allergies          Reviewed and updated as needed this visit by clinical staffTobacco  Allergies  Med Hx  Surg Hx  Fam Hx  Soc Hx      Reviewed and updated as needed this visit by Provider         ROS:  Noncontributory except as above    OBJECTIVE:     /77 (BP Location: Right arm, Patient Position: Chair, Cuff Size: Adult Regular)  Pulse 95  Temp 98.5  F (36.9  C) (Oral)  Wt 194 lb (88 kg)  SpO2 97%  BMI 24.74 kg/m2  Body mass index is 24.74 kg/(m^2).  GENERAL: healthy, alert and no distress  PSYCH: mentation appears normal, affect normal/bright    Diagnostic Test Results:  none     ASSESSMENT/PLAN:       ICD-10-CM    1. Attention deficit disorder (ADD) without hyperactivity F98.8 methylphenidate (METADATE CD) 30 MG CR capsule     methylphenidate (METADATE CD) 20 MG CR capsule     I will go ahead and give him another month's worth of his Metadate medication, but I did explain that this is a highly controlled substance and that he should not be just dropping into the clinic and seeing a new provider each time he needs a refill  This is one that needs ongoing monitoring and he should follow-up with his PCP for ongoing  assessment of his condition and further medication refills  I therefore advised him to see his PCP, Dr. SILVEIRA, in about 5 weeks for his next recheck  I also mentioned that it is unusual for a 37-year-old male  without vascular risk factors to have need for Viagra to have erections          I would presume this is more of a psychological issue for him and I will again defer to management of that to his PCP    Addendum:  After I left the room and was done with the visit, the patient came back to the nursing desk and said he had reconsidered and wanted to pass on these prescriptions for now and just return in the next 2 weeks to get his usual 3 month supply of medication from Dr. SILVEIRA.  He therefore gave us back the hard copies of the methylphenidate prescriptions and we tore those up and threw them away. He will therefore be due for a new prescription in about 2 weeks.      Pranav Coreas MD  [facility]

## 2017-09-25 ENCOUNTER — OFFICE VISIT (OUTPATIENT)
Dept: FAMILY MEDICINE | Facility: CLINIC | Age: 37
End: 2017-09-25
Payer: COMMERCIAL

## 2017-09-25 ENCOUNTER — TELEPHONE (OUTPATIENT)
Dept: FAMILY MEDICINE | Facility: CLINIC | Age: 37
End: 2017-09-25

## 2017-09-25 VITALS
DIASTOLIC BLOOD PRESSURE: 70 MMHG | HEART RATE: 77 BPM | BODY MASS INDEX: 25 KG/M2 | OXYGEN SATURATION: 100 % | TEMPERATURE: 97.5 F | WEIGHT: 196 LBS | SYSTOLIC BLOOD PRESSURE: 110 MMHG

## 2017-09-25 DIAGNOSIS — F98.8 ATTENTION DEFICIT DISORDER (ADD) WITHOUT HYPERACTIVITY: ICD-10-CM

## 2017-09-25 PROCEDURE — 99213 OFFICE O/P EST LOW 20 MIN: CPT | Performed by: FAMILY MEDICINE

## 2017-09-25 RX ORDER — METHYLPHENIDATE HYDROCHLORIDE 20 MG/1
20 CAPSULE, EXTENDED RELEASE ORAL DAILY
Qty: 30 CAPSULE | Refills: 0 | Status: SHIPPED | OUTPATIENT
Start: 2017-10-25 | End: 2017-09-25

## 2017-09-25 RX ORDER — METHYLPHENIDATE HYDROCHLORIDE 30 MG/1
30 CAPSULE, EXTENDED RELEASE ORAL DAILY
Qty: 30 CAPSULE | Refills: 0 | Status: SHIPPED | OUTPATIENT
Start: 2017-11-25 | End: 2017-12-28

## 2017-09-25 RX ORDER — METHYLPHENIDATE HYDROCHLORIDE 20 MG/1
20 CAPSULE, EXTENDED RELEASE ORAL DAILY
Qty: 30 CAPSULE | Refills: 0 | Status: SHIPPED | OUTPATIENT
Start: 2017-09-25 | End: 2017-09-25

## 2017-09-25 RX ORDER — METHYLPHENIDATE HYDROCHLORIDE 20 MG/1
20 CAPSULE, EXTENDED RELEASE ORAL DAILY
Qty: 30 CAPSULE | Refills: 0 | Status: SHIPPED | OUTPATIENT
Start: 2017-11-25 | End: 2017-12-28

## 2017-09-25 RX ORDER — METHYLPHENIDATE HYDROCHLORIDE 30 MG/1
30 CAPSULE, EXTENDED RELEASE ORAL DAILY
Qty: 30 CAPSULE | Refills: 0 | Status: SHIPPED | OUTPATIENT
Start: 2017-10-25 | End: 2017-09-25

## 2017-09-25 RX ORDER — METHYLPHENIDATE HYDROCHLORIDE 30 MG/1
30 CAPSULE, EXTENDED RELEASE ORAL DAILY
Qty: 30 CAPSULE | Refills: 0 | Status: SHIPPED | OUTPATIENT
Start: 2017-09-25 | End: 2017-09-25

## 2017-09-25 NOTE — NURSING NOTE
"Chief Complaint   Patient presents with     A.D.H.D       Initial /70 (BP Location: Right arm, Patient Position: Chair, Cuff Size: Adult Regular)  Pulse 77  Temp 97.5  F (36.4  C) (Oral)  Wt 196 lb (88.9 kg)  SpO2 100%  BMI 25 kg/m2 Estimated body mass index is 25 kg/(m^2) as calculated from the following:    Height as of 3/16/17: 6' 2.25\" (1.886 m).    Weight as of this encounter: 196 lb (88.9 kg).  Medication Reconciliation: complete   Nicolette Michael Vanegas MA      "

## 2017-09-25 NOTE — MR AVS SNAPSHOT
After Visit Summary   9/25/2017    Tommy Best    MRN: [id number]           Patient Information     Date Of Birth          1980        Visit Information        Provider Department      9/25/2017 8:00 AM Moose Parson MD [facility]        Today's Diagnoses     Attention deficit disorder (ADD) without hyperactivity           Follow-ups after your visit        Who to contact     If you have questions or need follow up information about today's clinic visit or your schedule please contact [facility] directly at [phone].  Normal or non-critical lab and imaging results will be communicated to you by Crossbarhart, letter or phone within 4 business days after the clinic has received the results. If you do not hear from us within 7 days, please contact the clinic through [employer]our[employer] or phone. If you have a critical or abnormal lab result, we will notify you by phone as soon as possible.  Submit refill requests through FounderSync or call your pharmacy and they will forward the refill request to us. Please allow 3 business days for your refill to be completed.          Additional Information About Your Visit        MyChart Information     FounderSync gives you secure access to your electronic health record. If you see a primary care provider, you can also send messages to your care team and make appointments. If you have questions, please call your primary care clinic.  If you do not have a primary care provider, please call [phone] and they will assist you.        Care EveryWhere ID     This is your Care EveryWhere ID. This could be used by other organizations to access your [place] medical records  [id number]        Your Vitals Were     Pulse Temperature Pulse Oximetry BMI (Body Mass Index)          77 97.5  F (36.4  C) (Oral) 100% 25 kg/m2         Blood Pressure from Last 3 Encounters:   09/25/17 110/70   09/15/17 116/77   06/30/17 118/76    Weight  from Last 3 Encounters:   09/25/17 196 lb (88.9 kg)   09/15/17 194 lb (88 kg)   07/14/17 200 lb (90.7 kg)              Today, you had the following     No orders found for display         Today's Medication Changes          These changes are accurate as of: 9/25/17  8:40 AM.  If you have any questions, ask your nurse or doctor.               These medicines have changed or have updated prescriptions.        Dose/Directions    * methylphenidate 30 MG CR capsule   Commonly known as:  METADATE CD   This may have changed:  Another medication with the same name was added. Make sure you understand how and when to take each.   Used for:  Attention deficit disorder (ADD) without hyperactivity   Changed by:  Pranav Coreas MD        Dose:  30 mg   Start taking on:  9/29/2017   Take 1 capsule (30 mg) by mouth daily   Quantity:  30 capsule   Refills:  0       * methylphenidate 20 MG CR capsule   Commonly known as:  METADATE CD   This may have changed:  Another medication with the same name was added. Make sure you understand how and when to take each.   Used for:  Attention deficit disorder (ADD) without hyperactivity   Changed by:  Pranav Coreas MD        Dose:  20 mg   Start taking on:  9/29/2017   Take 1 capsule (20 mg) by mouth daily   Quantity:  30 capsule   Refills:  0       * methylphenidate 20 MG CR capsule   Commonly known as:  METADATE CD   This may have changed:  You were already taking a medication with the same name, and this prescription was added. Make sure you understand how and when to take each.   Used for:  Attention deficit disorder (ADD) without hyperactivity   Changed by:  Moose Parson MD        Dose:  20 mg   Start taking on:  11/25/2017   Take 1 capsule (20 mg) by mouth daily   Quantity:  30 capsule   Refills:  0       * methylphenidate 30 MG CR capsule   Commonly known as:  METADATE CD   This may have changed:  You were already taking a medication with the same name, and this prescription  was added. Make sure you understand how and when to take each.   Used for:  Attention deficit disorder (ADD) without hyperactivity   Changed by:  Moose Parson MD        Dose:  30 mg   Start taking on:  11/25/2017   Take 1 capsule (30 mg) by mouth daily   Quantity:  30 capsule   Refills:  0       * Notice:  This list has 4 medication(s) that are the same as other medications prescribed for you. Read the directions carefully, and ask your doctor or other care provider to review them with you.         Where to get your medicines      Some of these will need a paper prescription and others can be bought over the counter.  Ask your nurse if you have questions.     Bring a paper prescription for each of these medications     methylphenidate 20 MG CR capsule    methylphenidate 30 MG CR capsule                Primary Care Provider Office Phone # Fax #    Moose Parson [phone]       [address] [facility] [address]        Equal Access to Services     [facility] : Hadii wesly lucio hadasho Soomaali, waaxda luqadaha, qaybta kaalmada adeegyada, waxay radhain hayrenettan maryann mccracken . So [facility] [phone].    ATENCIÓN: Si habla español, tiene a brady disposición servicios gratuitos de asistencia lingüística. Llame al [phone].    We comply with applicable federal civil rights laws and [place] laws. We do not discriminate on the basis of race, color, national origin, age, disability sex, sexual orientation or gender identity.            Thank you!     Thank you for choosing [facility]  for your care. Our goal is always to provide you with excellent care. Hearing back from our patients is one way we can continue to improve our services. Please take a few minutes to complete the written survey that you may receive in the mail after your visit with us. Thank you!             Your Updated Medication List - Protect others around you: Learn how to safely use,  store and throw away your medicines at www.disposemymeds.org.          This list is accurate as of: 9/25/17  8:40 AM.  Always use your most recent med list.                   Brand Name Dispense Instructions for use Diagnosis    * methylphenidate 30 MG CR capsule   Start taking on:  9/29/2017    METADATE CD    30 capsule    Take 1 capsule (30 mg) by mouth daily    Attention deficit disorder (ADD) without hyperactivity       * methylphenidate 20 MG CR capsule   Start taking on:  9/29/2017    METADATE CD    30 capsule    Take 1 capsule (20 mg) by mouth daily    Attention deficit disorder (ADD) without hyperactivity       * methylphenidate 20 MG CR capsule   Start taking on:  11/25/2017    METADATE CD    30 capsule    Take 1 capsule (20 mg) by mouth daily    Attention deficit disorder (ADD) without hyperactivity       * methylphenidate 30 MG CR capsule   Start taking on:  11/25/2017    METADATE CD    30 capsule    Take 1 capsule (30 mg) by mouth daily    Attention deficit disorder (ADD) without hyperactivity       sildenafil 100 MG tablet    VIAGRA    6 tablet    Take 0.5-1 tablets ( mg) by mouth daily as needed for erectile dysfunction Take 30 min to 4 hours before intercourse.  Never use with nitroglycerin, terazosin or doxazosin.    Other male erectile dysfunction       * Notice:  This list has 4 medication(s) that are the same as other medications prescribed for you. Read the directions carefully, and ask your doctor or other care provider to review them with you.

## 2017-09-25 NOTE — PROGRESS NOTES
SUBJECTIVE:   Tommy Best is a 37 year old male who presents to clinic today for the following health issues:      Medication Followup of Metadate    Taking Medication as prescribed: yes    Side Effects:  None    Medication Helping Symptoms:  yes       Patient doing well on the med   He came back for a prescription     No problems on the meds   He came back early and saw Dr Strong who was only going to give him a months worth   No facial tics   No palpitation     No headache   No chest pressure     Wife is going out of town more with work     O:/70 (BP Location: Right arm, Patient Position: Chair, Cuff Size: Adult Regular)  Pulse 77  Temp 97.5  F (36.4  C) (Oral)  Wt 196 lb (88.9 kg)  SpO2 100%  BMI 25 kg/m2    Head: Normocephalic, atraumatic.  Eyes: Conjunctiva clear, non icteric. PERRLA.  Ears: External ears and TMs normal BL.  Nose: Septum midline, nasal mucosa pink and moist. No discharge.  Mouth / Throat: Normal dentition.  No oral lesions. Pharynx non erythematous, tonsils without hypertrophy.  Neck: Supple, no enlarged LN, trachea midline.    Chest wall normal to inspection and palpation. Good excursion bilaterally. Lungs clear to auscultation. Good air movement bilaterally without rales, wheezes, or rhonchi.   Regular rate and  rhythm. S1 and S2 normal, no murmurs, clicks, gallops or rubs. No edema or JVD.      ICD-10-CM    1. Attention deficit disorder (ADD) without hyperactivity F98.8 methylphenidate (METADATE CD) 20 MG CR capsule     methylphenidate (METADATE CD) 30 MG CR capsule     DISCONTINUED: methylphenidate (METADATE CD) 30 MG CR capsule     DISCONTINUED: methylphenidate (METADATE CD) 20 MG CR capsule     DISCONTINUED: methylphenidate (METADATE CD) 20 MG CR capsule     DISCONTINUED: methylphenidate (METADATE CD) 30 MG CR capsule     Recheck in 3 months   Patient told to call me if his schedule puts him out of town early and we will adapt

## 2017-11-14 ENCOUNTER — ALLIED HEALTH/NURSE VISIT (OUTPATIENT)
Dept: NURSING | Facility: CLINIC | Age: 37
End: 2017-11-14
Payer: COMMERCIAL

## 2017-11-14 DIAGNOSIS — Z23 NEED FOR PROPHYLACTIC VACCINATION AND INOCULATION AGAINST INFLUENZA: Primary | ICD-10-CM

## 2017-11-14 PROCEDURE — 90686 IIV4 VACC NO PRSV 0.5 ML IM: CPT

## 2017-11-14 PROCEDURE — 90471 IMMUNIZATION ADMIN: CPT

## 2017-11-14 PROCEDURE — 99207 ZZC NO CHARGE NURSE ONLY: CPT

## 2017-11-14 NOTE — MR AVS SNAPSHOT
After Visit Summary   11/14/2017    Tommy Best    MRN: [id number]           Patient Information     Date Of Birth          1980        Visit Information        Provider Department      11/14/2017 10:45 AM [facility]        Today's Diagnoses     Need for prophylactic vaccination and inoculation against influenza    -  1       Follow-ups after your visit        Who to contact     If you have questions or need follow up information about today's clinic visit or your schedule please contact [facility] directly at [phone].  Normal or non-critical lab and imaging results will be communicated to you by [email or web address], letter or phone within 4 business days after the clinic has received the results. If you do not hear from us within 7 days, please contact the clinic through [email or web address] or phone. If you have a critical or abnormal lab result, we will notify you by phone as soon as possible.  Submit refill requests through [email or web address] or call your pharmacy and they will forward the refill request to us. Please allow 3 business days for your refill to be completed.          Additional Information About Your Visit        MyChart Information     Curex.Co gives you secure access to your electronic health record. If you see a primary care provider, you can also send messages to your care team and make appointments. If you have questions, please call your primary care clinic.  If you do not have a primary care provider, please call [phone] and they will assist you.        Care EveryWhere ID     This is your Care EveryWhere ID. This could be used by other organizations to access your [place] medical records  [id number]         Blood Pressure from Last 3 Encounters:   09/25/17 110/70   09/15/17 116/77   06/30/17 118/76    Weight from Last 3 Encounters:   09/25/17 196 lb (88.9 kg)   09/15/17 194 lb (88 kg)   07/14/17 200 lb (90.7 kg)              We  Performed the Following     FLU VAC, SPLIT VIRUS IM > 3 YO (QUADRIVALENT) [91609]     Vaccine Administration, Initial [68755]        Primary Care Provider Office Phone # Fax #    Moose Parson [phone]       [address] [facility] [address]        Equal Access to Services     JI ERIC : Hadii aad ku hadasho Soomaali, waaxda luqadaha, qaybta kaalmada adeegyada, waxay idiin hayaan adeeg kharash la'aan . So [facility] [phone].    ATENCIÓN: Si habla español, tiene a brady disposición servicios gratuitos de asistencia lingüística. Llame al [phone].    We comply with applicable federal civil rights laws and [place] laws. We do not discriminate on the basis of race, color, national origin, age, disability, sex, sexual orientation, or gender identity.            Thank you!     Thank you for choosing [facility]  for your care. Our goal is always to provide you with excellent care. Hearing back from our patients is one way we can continue to improve our services. Please take a few minutes to complete the written survey that you may receive in the mail after your visit with us. Thank you!             Your Updated Medication List - Protect others around you: Learn how to safely use, store and throw away your medicines at www.disposemymeds.org.          This list is accurate as of: 11/14/17 11:07 AM.  Always use your most recent med list.                   Brand Name Dispense Instructions for use Diagnosis    * methylphenidate 30 MG CR capsule    METADATE CD    30 capsule    Take 1 capsule (30 mg) by mouth daily    Attention deficit disorder (ADD) without hyperactivity       * methylphenidate 20 MG CR capsule    METADATE CD    30 capsule    Take 1 capsule (20 mg) by mouth daily    Attention deficit disorder (ADD) without hyperactivity       * methylphenidate 20 MG CR capsule   Start taking on:  11/25/2017    METADATE CD    30 capsule    Take 1 capsule (20  mg) by mouth daily    Attention deficit disorder (ADD) without hyperactivity       * methylphenidate 30 MG CR capsule   Start taking on:  11/25/2017    METADATE CD    30 capsule    Take 1 capsule (30 mg) by mouth daily    Attention deficit disorder (ADD) without hyperactivity       sildenafil 100 MG tablet    VIAGRA    6 tablet    Take 0.5-1 tablets ( mg) by mouth daily as needed for erectile dysfunction Take 30 min to 4 hours before intercourse.  Never use with nitroglycerin, terazosin or doxazosin.    Other male erectile dysfunction       * Notice:  This list has 4 medication(s) that are the same as other medications prescribed for you. Read the directions carefully, and ask your doctor or other care provider to review them with you.

## 2017-12-12 ENCOUNTER — MYC MEDICAL ADVICE (OUTPATIENT)
Dept: FAMILY MEDICINE | Facility: CLINIC | Age: 37
End: 2017-12-12

## 2017-12-28 ENCOUNTER — OFFICE VISIT (OUTPATIENT)
Dept: FAMILY MEDICINE | Facility: CLINIC | Age: 37
End: 2017-12-28
Payer: COMMERCIAL

## 2017-12-28 VITALS
OXYGEN SATURATION: 100 % | TEMPERATURE: 97.4 F | WEIGHT: 201 LBS | HEART RATE: 74 BPM | SYSTOLIC BLOOD PRESSURE: 126 MMHG | BODY MASS INDEX: 25.63 KG/M2 | DIASTOLIC BLOOD PRESSURE: 74 MMHG

## 2017-12-28 DIAGNOSIS — F98.8 ATTENTION DEFICIT DISORDER (ADD) WITHOUT HYPERACTIVITY: Primary | ICD-10-CM

## 2017-12-28 PROCEDURE — 99214 OFFICE O/P EST MOD 30 MIN: CPT | Performed by: FAMILY MEDICINE

## 2017-12-28 RX ORDER — METHYLPHENIDATE HYDROCHLORIDE 30 MG/1
30 CAPSULE, EXTENDED RELEASE ORAL DAILY
Qty: 30 CAPSULE | Refills: 0 | Status: SHIPPED | OUTPATIENT
Start: 2017-02-28 | End: 2018-03-05

## 2017-12-28 RX ORDER — METHYLPHENIDATE HYDROCHLORIDE EXTENDED RELEASE 20 MG/1
20 TABLET ORAL EVERY MORNING
Qty: 30 TABLET | Refills: 0 | Status: SHIPPED | OUTPATIENT
Start: 2017-12-28 | End: 2018-02-12

## 2017-12-28 RX ORDER — METHYLPHENIDATE HYDROCHLORIDE 20 MG/1
20 CAPSULE, EXTENDED RELEASE ORAL DAILY
Qty: 30 CAPSULE | Refills: 0 | Status: SHIPPED | OUTPATIENT
Start: 2018-02-28 | End: 2018-02-22

## 2017-12-28 RX ORDER — METHYLPHENIDATE HYDROCHLORIDE 30 MG/1
30 CAPSULE, EXTENDED RELEASE ORAL EVERY MORNING
Qty: 30 CAPSULE | Refills: 0 | Status: SHIPPED | OUTPATIENT
Start: 2017-12-28 | End: 2018-06-14

## 2017-12-28 RX ORDER — METHYLPHENIDATE HYDROCHLORIDE EXTENDED RELEASE 20 MG/1
20 TABLET ORAL EVERY MORNING
Qty: 30 TABLET | Refills: 0 | Status: SHIPPED | OUTPATIENT
Start: 2018-01-28 | End: 2018-03-16

## 2017-12-28 RX ORDER — METHYLPHENIDATE HYDROCHLORIDE 30 MG/1
30 CAPSULE, EXTENDED RELEASE ORAL DAILY
Qty: 30 CAPSULE | Refills: 0 | Status: SHIPPED | OUTPATIENT
Start: 2018-01-28 | End: 2018-06-14

## 2017-12-28 NOTE — NURSING NOTE
2 written prescription of Metadate ER 20 mg and and 3 written prescriptions of Metadate CD 30 mg and written prescrption of Ritalin 30 mg given to patient.  Nicolette See LETY Vanegas

## 2017-12-28 NOTE — MR AVS SNAPSHOT
After Visit Summary   12/28/2017    Tommy Best    MRN: [id number]           Patient Information     Date Of Birth          1980        Visit Information        Provider Department      12/28/2017 3:20 PM Moose Parson MD [facility]        Today's Diagnoses     Attention deficit disorder (ADD) without hyperactivity    -  1       Follow-ups after your visit        Who to contact     If you have questions or need follow up information about today's clinic visit or your schedule please contact [facility] directly at [phone].  Normal or non-critical lab and imaging results will be communicated to you by WindStream Technologieshart, letter or phone within 4 business days after the clinic has received the results. If you do not hear from us within 7 days, please contact the clinic through [employer] Software[employer] or phone. If you have a critical or abnormal lab result, we will notify you by phone as soon as possible.  Submit refill requests through Outbrain or call your pharmacy and they will forward the refill request to us. Please allow 3 business days for your refill to be completed.          Additional Information About Your Visit        MyChart Information     Outbrain gives you secure access to your electronic health record. If you see a primary care provider, you can also send messages to your care team and make appointments. If you have questions, please call your primary care clinic.  If you do not have a primary care provider, please call [phone] and they will assist you.        Care EveryWhere ID     This is your Care EveryWhere ID. This could be used by other organizations to access your [place] medical records  [id number]        Your Vitals Were     Pulse Temperature Pulse Oximetry BMI (Body Mass Index)          74 97.4  F (36.3  C) (Oral) 100% 25.63 kg/m2         Blood Pressure from Last 3 Encounters:   12/28/17 126/74   09/25/17 110/70   09/15/17 116/77     Weight from Last 3 Encounters:   12/28/17 201 lb (91.2 kg)   09/25/17 196 lb (88.9 kg)   09/15/17 194 lb (88 kg)              Today, you had the following     No orders found for display         Today's Medication Changes          These changes are accurate as of: 12/28/17  4:18 PM.  If you have any questions, ask your nurse or doctor.               These medicines have changed or have updated prescriptions.        Dose/Directions    * methylphenidate 30 MG CR capsule   Commonly known as:  METADATE CD   This may have changed:  Another medication with the same name was added. Make sure you understand how and when to take each.   Used for:  Attention deficit disorder (ADD) without hyperactivity   Changed by:  Moose Parson MD        Dose:  30 mg   Take 1 capsule (30 mg) by mouth daily   Quantity:  30 capsule   Refills:  0       * methylphenidate 30 MG CR capsule   Commonly known as:  METADATE CD   This may have changed:  Another medication with the same name was added. Make sure you understand how and when to take each.   Used for:  Attention deficit disorder (ADD) without hyperactivity   Changed by:  Pranav Coreas MD        Dose:  30 mg   Take 1 capsule (30 mg) by mouth daily   Quantity:  30 capsule   Refills:  0       * methylphenidate 20 MG CR capsule   Commonly known as:  METADATE CD   This may have changed:  Another medication with the same name was added. Make sure you understand how and when to take each.   Used for:  Attention deficit disorder (ADD) without hyperactivity   Changed by:  Pranav Coreas MD        Dose:  20 mg   Take 1 capsule (20 mg) by mouth daily   Quantity:  30 capsule   Refills:  0       * methylphenidate 30 MG CR capsule   Commonly known as:  METADATE CD   This may have changed:  You were already taking a medication with the same name, and this prescription was added. Make sure you understand how and when to take each.   Used for:  Attention deficit disorder (ADD) without  hyperactivity   Changed by:  Moose Parson MD        Dose:  30 mg   Take 1 capsule (30 mg) by mouth every morning   Quantity:  30 capsule   Refills:  0       * methylphenidate 20 MG CR tablet   Commonly known as:  METADATE ER   This may have changed:  You were already taking a medication with the same name, and this prescription was added. Make sure you understand how and when to take each.   Used for:  Attention deficit disorder (ADD) without hyperactivity   Changed by:  Moose Parson MD        Dose:  20 mg   Take 1 tablet (20 mg) by mouth every morning   Quantity:  30 tablet   Refills:  0       * methylphenidate 30 MG Cp24   Commonly known as:  RITALIN LA   This may have changed:  You were already taking a medication with the same name, and this prescription was added. Make sure you understand how and when to take each.   Used for:  Attention deficit disorder (ADD) without hyperactivity   Changed by:  Moose Parson MD        Dose:  30 mg   Start taking on:  1/28/2018   Take 30 mg by mouth daily   Quantity:  30 capsule   Refills:  0       * methylphenidate 20 MG CR tablet   Commonly known as:  METADATE ER   This may have changed:  You were already taking a medication with the same name, and this prescription was added. Make sure you understand how and when to take each.   Used for:  Attention deficit disorder (ADD) without hyperactivity   Changed by:  Moose Parson MD        Dose:  20 mg   Start taking on:  1/28/2018   Take 1 tablet (20 mg) by mouth every morning   Quantity:  30 tablet   Refills:  0       * methylphenidate 20 MG CR capsule   Commonly known as:  METADATE CD   This may have changed:  Another medication with the same name was added. Make sure you understand how and when to take each.   Used for:  Attention deficit disorder (ADD) without hyperactivity   Changed by:  Moose Parson MD        Dose:  20 mg   Start taking on:  2/28/2018   Take 1 capsule (20 mg) by  mouth daily   Quantity:  30 capsule   Refills:  0       * Notice:  This list has 8 medication(s) that are the same as other medications prescribed for you. Read the directions carefully, and ask your doctor or other care provider to review them with you.         Where to get your medicines      Some of these will need a paper prescription and others can be bought over the counter.  Ask your nurse if you have questions.     Bring a paper prescription for each of these medications     methylphenidate 20 MG CR capsule    methylphenidate 20 MG CR tablet    methylphenidate 20 MG CR tablet    methylphenidate 30 MG Cp24    methylphenidate 30 MG CR capsule    methylphenidate 30 MG CR capsule                Primary Care Provider Office Phone # Fax #    Moose Parson [phone]       [address] [facility] [address]        Equal Access to Services     GOMEZ ERIC : Elif Kearney, waaxruby luqadaha, qaybta kaalmada adeerrol, neel mccracken . So [facility] [phone].    ATENCIÓN: Si habla español, tiene a brady disposición servicios gratuitos de asistencia lingüística. Llame al [phone].    We comply with applicable federal civil rights laws and [place] laws. We do not discriminate on the basis of race, color, national origin, age, disability, sex, sexual orientation, or gender identity.            Thank you!     Thank you for choosing [facility]  for your care. Our goal is always to provide you with excellent care. Hearing back from our patients is one way we can continue to improve our services. Please take a few minutes to complete the written survey that you may receive in the mail after your visit with us. Thank you!             Your Updated Medication List - Protect others around you: Learn how to safely use, store and throw away your medicines at www.disposemymeds.org.          This list is accurate as of: 12/28/17   4:18 PM.  Always use your most recent med list.                   Brand Name Dispense Instructions for use Diagnosis    * methylphenidate 30 MG CR capsule    METADATE CD    30 capsule    Take 1 capsule (30 mg) by mouth daily    Attention deficit disorder (ADD) without hyperactivity       * methylphenidate 30 MG CR capsule    METADATE CD    30 capsule    Take 1 capsule (30 mg) by mouth daily    Attention deficit disorder (ADD) without hyperactivity       * methylphenidate 20 MG CR capsule    METADATE CD    30 capsule    Take 1 capsule (20 mg) by mouth daily    Attention deficit disorder (ADD) without hyperactivity       * methylphenidate 30 MG CR capsule    METADATE CD    30 capsule    Take 1 capsule (30 mg) by mouth every morning    Attention deficit disorder (ADD) without hyperactivity       * methylphenidate 20 MG CR tablet    METADATE ER    30 tablet    Take 1 tablet (20 mg) by mouth every morning    Attention deficit disorder (ADD) without hyperactivity       * methylphenidate 30 MG Cp24   Start taking on:  1/28/2018    RITALIN LA    30 capsule    Take 30 mg by mouth daily    Attention deficit disorder (ADD) without hyperactivity       * methylphenidate 20 MG CR tablet   Start taking on:  1/28/2018    METADATE ER    30 tablet    Take 1 tablet (20 mg) by mouth every morning    Attention deficit disorder (ADD) without hyperactivity       * methylphenidate 20 MG CR capsule   Start taking on:  2/28/2018    METADATE CD    30 capsule    Take 1 capsule (20 mg) by mouth daily    Attention deficit disorder (ADD) without hyperactivity       sildenafil 100 MG tablet    VIAGRA    6 tablet    Take 0.5-1 tablets ( mg) by mouth daily as needed for erectile dysfunction Take 30 min to 4 hours before intercourse.  Never use with nitroglycerin, terazosin or doxazosin.    Other male erectile dysfunction       * Notice:  This list has 8 medication(s) that are the same as other medications prescribed for you. Read the  directions carefully, and ask your doctor or other care provider to review them with you.

## 2017-12-28 NOTE — PROGRESS NOTES
SUBJECTIVE:   Tommy Best is a 37 year old male who presents to clinic today for the following health issues:      Medication Followup of all meds    Taking Medication as prescribed: yes    Side Effects:  None    Medication Helping Symptoms:  yes       Entire cold for the last 2 months   Son is sick and started it     Ros: no chest pain, chest tightness   No sob   No leg edema   No abdominal pain     Denies fever or chills   Weight stable     No twitches         O: /74 (BP Location: Left arm, Patient Position: Chair, Cuff Size: Adult Regular)  Pulse 74  Temp 97.4  F (36.3  C) (Oral)  Wt 201 lb (91.2 kg)  SpO2 100%  BMI 25.63 kg/m2      Wt Readings from Last 4 Encounters:   12/28/17 201 lb (91.2 kg)   09/25/17 196 lb (88.9 kg)   09/15/17 194 lb (88 kg)   07/14/17 200 lb (90.7 kg)       Chest wall normal to inspection and palpation. Good excursion bilaterally. Lungs clear to auscultation. Good air movement bilaterally without rales, wheezes, or rhonchi.   Regular rate and  rhythm. S1 and S2 normal, no murmurs, clicks, gallops or rubs. No edema or JVD.        ICD-10-CM    1. Attention deficit disorder (ADD) without hyperactivity F98.8

## 2018-01-02 PROBLEM — M54.2 NECK PAIN: Status: RESOLVED | Noted: 2017-07-13 | Resolved: 2018-01-02

## 2018-01-02 NOTE — PROGRESS NOTES
Subjective:  HPI                    Objective:  System    Physical Exam    General     ROS    Assessment/Plan:    DISCHARGE REPORT    Progress reporting period is from 7/13/2017 to 8/16/2017.     SUBJECTIVE  Subjective: Getting better.     Current Pain level: 2/10   Initial Pain level: 4/10   Changes in function: Yes, see goal flow sheet for change in function   Adverse reactions: None;   ,     Patient has failed to return to therapy so current objective findings are unknown.  The subjective and objective information are from the last SOAP note on this patient.    OBJECTIVE  Objective: Fatigues with new exercises      ASSESSMENT/PLAN  Updated problem list and treatment plan: Diagnosis 1:  Neck pain  Pain -  US, electric stimulation, manual therapy, self management, education, directional preference exercise and home program  Decreased ROM/flexibility - manual therapy and therapeutic exercise  Decreased function - therapeutic activities  STG/LTGs have been met or progress has been made towards goals:  Yes (See Goal flow sheet completed today.)  Assessment of Progress: The patient has not returned to therapy. Current status is unknown.  Self Management Plans:  Patient has been instructed in a home treatment program.    Tommy continues to require the following intervention to meet STG and LTG's: PT intervention is no longer required to meet STG/LTG.  The patient failed to complete scheduled/ordered appointments so current information is unknown.    Recommendations:  Discharge pt from PT.  Did not schedule additional visits.      Please refer to the daily flowsheet for treatment today, total treatment time and time spent performing 1:1 timed codes.

## 2018-02-07 ENCOUNTER — MYC MEDICAL ADVICE (OUTPATIENT)
Dept: FAMILY MEDICINE | Facility: CLINIC | Age: 38
End: 2018-02-07

## 2018-02-07 NOTE — TELEPHONE ENCOUNTER
Routed to PCP to please advise.  See other my chart messages also.  Julieta Elaine RN CPC Triage.

## 2018-02-09 ENCOUNTER — TELEPHONE (OUTPATIENT)
Dept: FAMILY MEDICINE | Facility: CLINIC | Age: 38
End: 2018-02-09

## 2018-02-09 DIAGNOSIS — F98.8 ATTENTION DEFICIT DISORDER (ADD) WITHOUT HYPERACTIVITY: ICD-10-CM

## 2018-02-09 DIAGNOSIS — F98.8 ATTENTION DEFICIT DISORDER (ADD) WITHOUT HYPERACTIVITY: Primary | ICD-10-CM

## 2018-02-09 NOTE — LETTER
[address] [id number] [phone]        February 12, 2018    Regarding:  Tommy Best  [facility] [facility] [address]              To Whom It May Concern;    This is a patient that has been diagnosed with ADD. He is a working professional.  He was diagnosed when he was 25 years old and was in graduate school.  He had difficulty with completing tasks and with concentrations.  After a trial of metadate he improved and has been on the medication on a stable dose since that time.  He has had no side effects and his vital signs have been normal     This is not a new prior authorization,  It has been approved in the past and this is a renewal of that previous prior authorization.     He continues to be employed and continues to have a medical neccesity to continue this medication.  He is monitored every 3 months and has not had any variation of his need for the drug.      I am submitting this in re-consideration for this medication.            Sincerely,        Moose Parson MD

## 2018-02-09 NOTE — TELEPHONE ENCOUNTER
Central Prior Authorization Team   Phone: [phone]    PA Initiation    Medication: methylphenidate (METADATE CD) 20 MG CR capsule   Insurance Company: [employer] ([facility]) - Phone [phone] Fax [phone]  Pharmacy Filling the Rx: [id number] - [place], [place] - [address] AT [address] OF [facility]  Filling Pharmacy Phone: [phone]  Filling Pharmacy Fax:    Start Date: 2/9/2018

## 2018-02-09 NOTE — TELEPHONE ENCOUNTER
A new telephone encounter has been created for this prior authorization.  All documentation will be done on new encounter.

## 2018-02-09 NOTE — TELEPHONE ENCOUNTER
Prior Authorization Retail Medication Request  Medication/Dose: Methylphenidate ER 20 mg  Diagnosis and ICD code: F98.8  New/Renewal/Insurance Change PA: Renewal  Previously Tried and Failed Therapies: None    Insurance ID (if provided): [id number] ([facility] out of state)  Insurance Phone (if provided): [phone]     Any additional info from fax request:     If you received a fax notification from an outside Pharmacy:  Pharmacy Name:[facility]   Pharmacy #:[phone]  Pharmacy Fax:[phone]

## 2018-02-09 NOTE — TELEPHONE ENCOUNTER
Please start a prior auth.  This patient has been stable on this dose for years.  He is taking both the 30 mg in and and then 20 mg in the afternoon

## 2018-02-12 RX ORDER — METHYLPHENIDATE HYDROCHLORIDE EXTENDED RELEASE 20 MG/1
20 TABLET ORAL EVERY MORNING
Qty: 30 TABLET | Refills: 0 | Status: SHIPPED | OUTPATIENT
Start: 2018-02-12 | End: 2018-02-22 | Stop reason: ALTCHOICE

## 2018-02-12 NOTE — TELEPHONE ENCOUNTER
I see that one of the prescriptions was sent over with a different brand name.  Could this be there reason the prior auth did not go through  (ritalin vs metadate)  I have corrected this. Will it go through now?    He has been taking this med since at least 2007 as documented from records at another clinic

## 2018-02-12 NOTE — TELEPHONE ENCOUNTER
Medication Appeal Initiation    We have initiated an appeal for the requested medication:  Medication: methylphenidate (METADATE CD) 20 MG CR capsule - DENIED  Appeal Start Date:     Insurance Company: [employer] ([facility]) - Phone [phone] Fax [phone]  Comments:  Appeal faxed to insurance.  Appeals can take up to 30 days to reach a determination.  Will continue to follow up with insurance.

## 2018-02-12 NOTE — TELEPHONE ENCOUNTER
PRIOR AUTHORIZATION DENIED    Medication: methylphenidate (METADATE CD) 20 MG CR capsule - DENIED    Denial Date: 2/10/2018    Denial Rational:     Appeal Information:

## 2018-02-20 NOTE — TELEPHONE ENCOUNTER
Per insurance patient has an existing PA on file for generic Metadate CD capsules if there is a script written for that instead of the Metadate ER tablets it should be covered with existing authorization on file.  Insurance stated they will not cover the tablet formulation of medication.

## 2018-02-21 ENCOUNTER — MYC MEDICAL ADVICE (OUTPATIENT)
Dept: FAMILY MEDICINE | Facility: CLINIC | Age: 38
End: 2018-02-21

## 2018-02-21 DIAGNOSIS — F98.8 ATTENTION DEFICIT DISORDER (ADD) WITHOUT HYPERACTIVITY: ICD-10-CM

## 2018-02-21 RX ORDER — METHYLPHENIDATE HYDROCHLORIDE 30 MG/1
30 CAPSULE, EXTENDED RELEASE ORAL EVERY MORNING
Qty: 30 CAPSULE | Refills: 0 | Status: CANCELLED | OUTPATIENT
Start: 2018-02-21

## 2018-02-21 NOTE — TELEPHONE ENCOUNTER
Called pharmacy - they did not receive the Metadate order from 2/12/2018 - routing to provider -the Metadate order that was sent with Ritalin instead of Metadate was a 30 mg tab - routing to provider to print new prescription.  Nurse checked at  prescription is not there.   Cued Metadate 30 mg order and pharmacy - patient will need to  and bring to pharmacy or we need to mail it.  My chart reply sent to patient explaining and asking if he wants to  prescription or have us mail it?    Routing to PCP to reprint the correct prescription  Medication and pharmacy cued.      Pattie Lopez RN  [facility]

## 2018-02-22 RX ORDER — METHYLPHENIDATE HYDROCHLORIDE 20 MG/1
20 CAPSULE, EXTENDED RELEASE ORAL DAILY
Qty: 30 CAPSULE | Refills: 0 | Status: SHIPPED | OUTPATIENT
Start: 2018-02-28 | End: 2018-06-14

## 2018-03-04 ENCOUNTER — MYC MEDICAL ADVICE (OUTPATIENT)
Dept: FAMILY MEDICINE | Facility: CLINIC | Age: 38
End: 2018-03-04

## 2018-03-04 DIAGNOSIS — F98.8 ATTENTION DEFICIT DISORDER (ADD) WITHOUT HYPERACTIVITY: ICD-10-CM

## 2018-03-05 RX ORDER — METHYLPHENIDATE HYDROCHLORIDE 20 MG/1
20 CAPSULE, EXTENDED RELEASE ORAL DAILY
Qty: 30 CAPSULE | Refills: 0 | Status: SHIPPED | OUTPATIENT
Start: 2018-03-05 | End: 2018-09-14

## 2018-03-05 RX ORDER — METHYLPHENIDATE HYDROCHLORIDE 30 MG/1
30 CAPSULE, EXTENDED RELEASE ORAL DAILY
Qty: 30 CAPSULE | Refills: 0 | Status: SHIPPED | OUTPATIENT
Start: 2018-03-05 | End: 2018-09-14

## 2018-03-05 NOTE — TELEPHONE ENCOUNTER
Routing refill request to provider for review/approval because:  Drug not on the [facility] refill protocol   Julieta Elaine RN CPC Triage.

## 2018-03-16 ENCOUNTER — OFFICE VISIT (OUTPATIENT)
Dept: FAMILY MEDICINE | Facility: CLINIC | Age: 38
End: 2018-03-16
Payer: COMMERCIAL

## 2018-03-16 ENCOUNTER — TELEPHONE (OUTPATIENT)
Dept: FAMILY MEDICINE | Facility: CLINIC | Age: 38
End: 2018-03-16

## 2018-03-16 VITALS
BODY MASS INDEX: 24 KG/M2 | HEIGHT: 75 IN | OXYGEN SATURATION: 99 % | HEART RATE: 72 BPM | TEMPERATURE: 97.9 F | DIASTOLIC BLOOD PRESSURE: 71 MMHG | SYSTOLIC BLOOD PRESSURE: 113 MMHG | WEIGHT: 193 LBS

## 2018-03-16 DIAGNOSIS — F98.8 ATTENTION DEFICIT DISORDER (ADD) WITHOUT HYPERACTIVITY: ICD-10-CM

## 2018-03-16 PROCEDURE — 99213 OFFICE O/P EST LOW 20 MIN: CPT | Performed by: FAMILY MEDICINE

## 2018-03-16 RX ORDER — METHYLPHENIDATE HYDROCHLORIDE 30 MG/1
30 CAPSULE, EXTENDED RELEASE ORAL DAILY
Qty: 30 CAPSULE | Refills: 0 | Status: SHIPPED | OUTPATIENT
Start: 2018-03-16 | End: 2018-04-15

## 2018-03-16 RX ORDER — METHYLPHENIDATE HYDROCHLORIDE 20 MG/1
20 CAPSULE, EXTENDED RELEASE ORAL DAILY
Qty: 30 CAPSULE | Refills: 0 | Status: SHIPPED | OUTPATIENT
Start: 2018-03-16 | End: 2018-04-15

## 2018-03-16 RX ORDER — METHYLPHENIDATE HYDROCHLORIDE 20 MG/1
20 CAPSULE, EXTENDED RELEASE ORAL DAILY
Qty: 30 CAPSULE | Refills: 0 | Status: SHIPPED | OUTPATIENT
Start: 2018-05-17 | End: 2018-06-16

## 2018-03-16 RX ORDER — METHYLPHENIDATE HYDROCHLORIDE 30 MG/1
30 CAPSULE, EXTENDED RELEASE ORAL DAILY
Qty: 30 CAPSULE | Refills: 0 | Status: SHIPPED | OUTPATIENT
Start: 2018-05-17 | End: 2018-06-16

## 2018-03-16 RX ORDER — METHYLPHENIDATE HYDROCHLORIDE 30 MG/1
30 CAPSULE, EXTENDED RELEASE ORAL DAILY
Qty: 30 CAPSULE | Refills: 0 | Status: SHIPPED | OUTPATIENT
Start: 2018-04-16 | End: 2018-05-16

## 2018-03-16 RX ORDER — METHYLPHENIDATE HYDROCHLORIDE 20 MG/1
20 CAPSULE, EXTENDED RELEASE ORAL DAILY
Qty: 30 CAPSULE | Refills: 0 | Status: SHIPPED | OUTPATIENT
Start: 2018-04-16 | End: 2018-05-16

## 2018-03-16 NOTE — MR AVS SNAPSHOT
After Visit Summary   3/16/2018    Tommy Best    MRN: [id number]           Patient Information     Date Of Birth          1980        Visit Information        Provider Department      3/16/2018 9:40 AM Moose Parson MD [facility]        Today's Diagnoses     Attention deficit disorder (ADD) without hyperactivity          Care Instructions      Preventive Health Recommendations  Male Ages 26 - 39    Yearly exam:             See your health care provider every year in order to  o   Review health changes.   o   Discuss preventive care.    o   Review your medicines if your doctor has prescribed any.    You should be tested each year for STDs (sexually transmitted diseases), if you re at risk.     After age 35, talk to your provider about cholesterol testing. If you are at risk for heart disease, have your cholesterol tested at least every 5 years.     If you are at risk for diabetes, you should have a diabetes test (fasting glucose).  Shots: Get a flu shot each year. Get a tetanus shot every 10 years.     Nutrition:    Eat at least 5 servings of fruits and vegetables daily.     Eat whole-grain bread, whole-wheat pasta and brown rice instead of white grains and rice.     Talk to your provider about Calcium and Vitamin D.     Lifestyle    Exercise for at least 150 minutes a week (30 minutes a day, 5 days a week). This will help you control your weight and prevent disease.     Limit alcohol to one drink per day.     No smoking.     Wear sunscreen to prevent skin cancer.     See your dentist every six months for an exam and cleaning.             Follow-ups after your visit        Who to contact     If you have questions or need follow up information about today's clinic visit or your schedule please contact [facility] directly at [phone].  Normal or non-critical lab and imaging results will be communicated to you by MyChart, letter or phone  "within 4 business days after the clinic has received the results. If you do not hear from us within 7 days, please contact the clinic through [facility] or phone. If you have a critical or abnormal lab result, we will notify you by phone as soon as possible.  Submit refill requests through [facility] or call your pharmacy and they will forward the refill request to us. Please allow 3 business days for your refill to be completed.          Additional Information About Your Visit        [facility] Information     [facility] gives you secure access to your electronic health record. If you see a primary care provider, you can also send messages to your care team and make appointments. If you have questions, please call your primary care clinic.  If you do not have a primary care provider, please call [phone] and they will assist you.        Care EveryWhere ID     This is your Care EveryWhere ID. This could be used by other organizations to access your [facility] medical records  [id number]        Your Vitals Were     Pulse Temperature Height Pulse Oximetry BMI (Body Mass Index)       72 97.9  F (36.6  C) (Oral) 6' 2.5\" (1.892 m) 99% 24.45 kg/m2        Blood Pressure from Last 3 Encounters:   03/16/18 113/71   12/28/17 126/74   09/25/17 110/70    Weight from Last 3 Encounters:   03/16/18 193 lb (87.5 kg)   12/28/17 201 lb (91.2 kg)   09/25/17 196 lb (88.9 kg)              Today, you had the following     No orders found for display         Today's Medication Changes          These changes are accurate as of 3/16/18 10:03 AM.  If you have any questions, ask your nurse or doctor.               These medicines have changed or have updated prescriptions.        Dose/Directions    * methylphenidate 30 MG CR capsule   Commonly known as:  METADATE CD   This may have changed:  Another medication with the same name was added. Make sure you understand how and when to take each.   Used for:  Attention deficit disorder (ADD) without " hyperactivity   Changed by:  Moose Parson MD        Dose:  30 mg   Take 1 capsule (30 mg) by mouth daily   Quantity:  30 capsule   Refills:  0       * methylphenidate 30 MG CR capsule   Commonly known as:  METADATE CD   This may have changed:  Another medication with the same name was added. Make sure you understand how and when to take each.   Used for:  Attention deficit disorder (ADD) without hyperactivity   Changed by:  Moose Parson MD        Dose:  30 mg   Take 1 capsule (30 mg) by mouth every morning   Quantity:  30 capsule   Refills:  0       * methylphenidate 30 MG Cp24   Commonly known as:  RITALIN LA   This may have changed:  Another medication with the same name was added. Make sure you understand how and when to take each.   Used for:  Attention deficit disorder (ADD) without hyperactivity   Changed by:  Moose Parson MD        Dose:  30 mg   Take 30 mg by mouth daily   Quantity:  30 capsule   Refills:  0       * methylphenidate 20 MG CR capsule   Commonly known as:  METADATE CD   This may have changed:  Another medication with the same name was added. Make sure you understand how and when to take each.   Used for:  Attention deficit disorder (ADD) without hyperactivity   Changed by:  Moose Parson MD        Dose:  20 mg   Take 1 capsule (20 mg) by mouth daily   Quantity:  30 capsule   Refills:  0       * methylphenidate 20 MG CR capsule   Commonly known as:  METADATE CD   This may have changed:  Another medication with the same name was added. Make sure you understand how and when to take each.   Used for:  Attention deficit disorder (ADD) without hyperactivity   Changed by:  Moose Parson MD        Dose:  20 mg   Take 1 capsule (20 mg) by mouth daily   Quantity:  30 capsule   Refills:  0       * methylphenidate 30 MG CR capsule   Commonly known as:  METADATE CD   This may have changed:  Another medication with the same name was added. Make sure you understand  how and when to take each.   Used for:  Attention deficit disorder (ADD) without hyperactivity   Changed by:  Moose Parson MD        Dose:  30 mg   Take 1 capsule (30 mg) by mouth daily   Quantity:  30 capsule   Refills:  0       * methylphenidate 30 MG CR capsule   Commonly known as:  METADATE CD   This may have changed:  You were already taking a medication with the same name, and this prescription was added. Make sure you understand how and when to take each.   Used for:  Attention deficit disorder (ADD) without hyperactivity   Changed by:  Moose Parson MD        Dose:  30 mg   Take 1 capsule (30 mg) by mouth daily   Quantity:  30 capsule   Refills:  0       * methylphenidate 20 MG CR capsule   Commonly known as:  METADATE CD   This may have changed:  You were already taking a medication with the same name, and this prescription was added. Make sure you understand how and when to take each.   Used for:  Attention deficit disorder (ADD) without hyperactivity   Changed by:  Moose Parson MD        Dose:  20 mg   Take 1 capsule (20 mg) by mouth daily   Quantity:  30 capsule   Refills:  0       * methylphenidate 30 MG CR capsule   Commonly known as:  METADATE CD   This may have changed:  You were already taking a medication with the same name, and this prescription was added. Make sure you understand how and when to take each.   Used for:  Attention deficit disorder (ADD) without hyperactivity   Changed by:  Moose Parson MD        Dose:  30 mg   Start taking on:  4/16/2018   Take 1 capsule (30 mg) by mouth daily   Quantity:  30 capsule   Refills:  0       * methylphenidate 20 MG CR capsule   Commonly known as:  METADATE CD   This may have changed:  You were already taking a medication with the same name, and this prescription was added. Make sure you understand how and when to take each.   Used for:  Attention deficit disorder (ADD) without hyperactivity   Changed by:  Eb  Moose UNDERWOOD MD        Dose:  20 mg   Start taking on:  4/16/2018   Take 1 capsule (20 mg) by mouth daily   Quantity:  30 capsule   Refills:  0       * methylphenidate 30 MG CR capsule   Commonly known as:  METADATE CD   This may have changed:  You were already taking a medication with the same name, and this prescription was added. Make sure you understand how and when to take each.   Used for:  Attention deficit disorder (ADD) without hyperactivity   Changed by:  Moose Parson MD        Dose:  30 mg   Start taking on:  5/17/2018   Take 1 capsule (30 mg) by mouth daily   Quantity:  30 capsule   Refills:  0       * methylphenidate 20 MG CR capsule   Commonly known as:  METADATE CD   This may have changed:  You were already taking a medication with the same name, and this prescription was added. Make sure you understand how and when to take each.   Used for:  Attention deficit disorder (ADD) without hyperactivity   Changed by:  Moose Parson MD        Dose:  20 mg   Start taking on:  5/17/2018   Take 1 capsule (20 mg) by mouth daily   Quantity:  30 capsule   Refills:  0       * Notice:  This list has 12 medication(s) that are the same as other medications prescribed for you. Read the directions carefully, and ask your doctor or other care provider to review them with you.         Where to get your medicines      Some of these will need a paper prescription and others can be bought over the counter.  Ask your nurse if you have questions.     Bring a paper prescription for each of these medications     methylphenidate 20 MG CR capsule    methylphenidate 20 MG CR capsule    methylphenidate 20 MG CR capsule    methylphenidate 30 MG CR capsule    methylphenidate 30 MG CR capsule    methylphenidate 30 MG CR capsule                Primary Care Provider Office Phone # Fax #    Moose Parson [phone]       [address] [place] [address]        Equal Access to Services      GOMEZ ERIC : Hadii aad ku danyell Sojasali, waaxda luqadaha, qaybta kaalmada adeegyada, neel quincy jonathankwan wolf elvamitchell mccracken . So [facility] [phone].    ATENCIÓN: Si habla español, tiene a brady disposición servicios gratuitos de asistencia lingüística. Llame al [phone].    We comply with applicable federal civil rights laws and [place] laws. We do not discriminate on the basis of race, color, national origin, age, disability, sex, sexual orientation, or gender identity.            Thank you!     Thank you for choosing [facility] your care. Our goal is always to provide you with excellent care. Hearing back from our patients is one way we can continue to improve our services. Please take a few minutes to complete the written survey that you may receive in the mail after your visit with us. Thank you!             Your Updated Medication List - Protect others around you: Learn how to safely use, store and throw away your medicines at www.disposemymeds.org.          This list is accurate as of 3/16/18 10:03 AM.  Always use your most recent med list.                   Brand Name Dispense Instructions for use Diagnosis    * methylphenidate 30 MG CR capsule    METADATE CD    30 capsule    Take 1 capsule (30 mg) by mouth daily    Attention deficit disorder (ADD) without hyperactivity       * methylphenidate 30 MG CR capsule    METADATE CD    30 capsule    Take 1 capsule (30 mg) by mouth every morning    Attention deficit disorder (ADD) without hyperactivity       * methylphenidate 30 MG Cp24    RITALIN LA    30 capsule    Take 30 mg by mouth daily    Attention deficit disorder (ADD) without hyperactivity       * methylphenidate 20 MG CR capsule    METADATE CD    30 capsule    Take 1 capsule (20 mg) by mouth daily    Attention deficit disorder (ADD) without hyperactivity       * methylphenidate 20 MG CR capsule    METADATE CD    30 capsule    Take 1 capsule (20 mg) by mouth daily     Attention deficit disorder (ADD) without hyperactivity       * methylphenidate 30 MG CR capsule    METADATE CD    30 capsule    Take 1 capsule (30 mg) by mouth daily    Attention deficit disorder (ADD) without hyperactivity       * methylphenidate 30 MG CR capsule    METADATE CD    30 capsule    Take 1 capsule (30 mg) by mouth daily    Attention deficit disorder (ADD) without hyperactivity       * methylphenidate 20 MG CR capsule    METADATE CD    30 capsule    Take 1 capsule (20 mg) by mouth daily    Attention deficit disorder (ADD) without hyperactivity       * methylphenidate 30 MG CR capsule   Start taking on:  4/16/2018    METADATE CD    30 capsule    Take 1 capsule (30 mg) by mouth daily    Attention deficit disorder (ADD) without hyperactivity       * methylphenidate 20 MG CR capsule   Start taking on:  4/16/2018    METADATE CD    30 capsule    Take 1 capsule (20 mg) by mouth daily    Attention deficit disorder (ADD) without hyperactivity       * methylphenidate 30 MG CR capsule   Start taking on:  5/17/2018    METADATE CD    30 capsule    Take 1 capsule (30 mg) by mouth daily    Attention deficit disorder (ADD) without hyperactivity       * methylphenidate 20 MG CR capsule   Start taking on:  5/17/2018    METADATE CD    30 capsule    Take 1 capsule (20 mg) by mouth daily    Attention deficit disorder (ADD) without hyperactivity       sildenafil 100 MG tablet    VIAGRA    6 tablet    Take 0.5-1 tablets ( mg) by mouth daily as needed for erectile dysfunction Take 30 min to 4 hours before intercourse.  Never use with nitroglycerin, terazosin or doxazosin.    Other male erectile dysfunction       * Notice:  This list has 12 medication(s) that are the same as other medications prescribed for you. Read the directions carefully, and ask your doctor or other care provider to review them with you.

## 2018-03-16 NOTE — PROGRESS NOTES
"SUBJECTIVE:   CC: Tommy Best is an 38 year old male who presents for preventative health visit.     Physical   Annual:     Getting at least 3 servings of Calcium per day::  Yes    Bi-annual eye exam::  Yes    Dental care twice a year::  Yes    Sleep apnea or symptoms of sleep apnea::  None    Diet::  Regular (no restrictions) and Breakfast skipped    Taking medications regularly::  Yes    Medication side effects::  None    Additional concerns today::  No              Today's PHQ-2 Score:   PHQ-2 ( 1999 [employer]) 3/16/2018   Q1: Little interest or pleasure in doing things 0   Q2: Feeling down, depressed or hopeless 0   PHQ-2 Score 0   Q1: Little interest or pleasure in doing things Not at all   Q2: Feeling down, depressed or hopeless Not at all   PHQ-2 Score 0         Social History   Substance Use Topics     Smoking status: Never Smoker     Smokeless tobacco: Never Used     Alcohol use Yes     No flowsheet data found.{add AUDIT responses (Optional) (A score of 7 for adult men is an indication of hazardous drinking; a score of 8 or more is an indication of an alcohol use disorder.  A score of 7 or more for adult women is an indication of hazardous drinking or an alchohol use disorder):746291}    Last PSA: No results found for: PSA    Reviewed orders with patient. Reviewed health maintenance and updated orders accordingly - {Yes/No:133285::\"Yes\"}  {Chronicprobdata (Optional):004660}    Reviewed and updated as needed this visit by clinical staff         Reviewed and updated as needed this visit by Provider        {HISTORY OPTIONS (Optional):146207}    Review of Systems  {MALE ROS-adult preventive care package:019512::\"C: NEGATIVE for fever, chills, change in weight\",\"I: NEGATIVE for worrisome rashes, moles or lesions\",\"E: NEGATIVE for vision changes or irritation\",\"ENT: NEGATIVE for ear, mouth and throat problems\",\"R: NEGATIVE for significant cough or SOB\",\"CV: NEGATIVE for chest pain, palpitations or peripheral " "edema\",\"GI: NEGATIVE for nausea, abdominal pain, heartburn, or change in bowel habits\",\" male: negative for dysuria, hematuria, decreased urinary stream, erectile dysfunction, urethral discharge\",\"M: NEGATIVE for significant arthralgias or myalgia\",\"N: NEGATIVE for weakness, dizziness or paresthesias\",\"P: NEGATIVE for changes in mood or affect\"}    OBJECTIVE:   There were no vitals taken for this visit.    Physical Exam  {Exam Choices:557554}    ASSESSMENT/PLAN:   {Diag Picklist:797431}    COUNSELING:   {MALE COUNSELING MESSAGES:678247::\"Reviewed preventive health counseling, as reflected in patient instructions\"}    {BP Counseling- Complete if BP >= 120/80  (Optional):442789}     reports that he has never smoked. He has never used smokeless tobacco.  {Tobacco Cessation -- Complete if patient is a smoker (Optional):065124}  Estimated body mass index is 25.63 kg/(m^2) as calculated from the following:    Height as of 3/16/17: 6' 2.25\" (1.886 m).    Weight as of 12/28/17: 201 lb (91.2 kg).   {Weight Management Plan (ACO) Complete if BMI is abnormal-  Ages 18-64  BMI >24.9.  Age 65+ with BMI <23 or >30 (Optional):917765}    Counseling Resources:  ATP IV Guidelines  Pooled Cohorts Equation Calculator  FRAX Risk Assessment  ICSI Preventive Guidelines  Dietary Guidelines for Americans, 2010  USDA's MyPlate  ASA Prophylaxis  Lung CA Screening    Moose Parson MD  [facility]  Answers for HPI/ROS submitted by the patient on 3/16/2018   PHQ-2 Score: 0    "

## 2018-03-16 NOTE — NURSING NOTE
"Chief Complaint   Patient presents with     Recheck Medication     Methylphenidate       Initial /71 (BP Location: Left arm, Patient Position: Sitting, Cuff Size: Adult Regular)  Pulse 72  Temp 97.9  F (36.6  C) (Oral)  Ht 6' 2.5\" (1.892 m)  Wt 193 lb (87.5 kg)  SpO2 99%  BMI 24.45 kg/m2 Estimated body mass index is 24.45 kg/(m^2) as calculated from the following:    Height as of this encounter: 6' 2.5\" (1.892 m).    Weight as of this encounter: 193 lb (87.5 kg).  Medication Reconciliation: complete   Ivonne Estrada MA    3 Prescription's of Methylphenidate 20mg and 3 Rx's Methylphenidate 30mg was given to patient at office visit  Date:  3/16/18  Signature:  Ivonne Estrada MA          "

## 2018-03-16 NOTE — PROGRESS NOTES
SUBJECTIVE:   Tommy Best is a 38 year old male who presents to clinic today for the following health issues:    Medication Followup of methylphenidate    Taking Medication as prescribed: yes    Side Effects:  None    Medication Helping Symptoms:  yes     Problem list and histories reviewed & adjusted, as indicated.  Additional history: as documented    Current Outpatient Prescriptions   Medication Sig Dispense Refill     methylphenidate (METADATE CD) 20 MG CR capsule Take 1 capsule (20 mg) by mouth daily 30 capsule 0     methylphenidate (METADATE CD) 30 MG CR capsule Take 1 capsule (30 mg) by mouth daily 30 capsule 0     methylphenidate (METADATE CD) 20 MG CR capsule Take 1 capsule (20 mg) by mouth daily 30 capsule 0     methylphenidate (RITALIN LA) 30 MG CP24 Take 30 mg by mouth daily 30 capsule 0     methylphenidate (METADATE ER) 20 MG CR tablet Take 1 tablet (20 mg) by mouth every morning 30 tablet 0     methylphenidate (METADATE CD) 30 MG CR capsule Take 1 capsule (30 mg) by mouth every morning 30 capsule 0     methylphenidate (METADATE CD) 30 MG CR capsule Take 1 capsule (30 mg) by mouth daily 30 capsule 0     sildenafil (VIAGRA) 100 MG cap/tab Take 0.5-1 tablets ( mg) by mouth daily as needed for erectile dysfunction Take 30 min to 4 hours before intercourse.  Never use with nitroglycerin, terazosin or doxazosin. 6 tablet 11     Allergies   Allergen Reactions     Seasonal Allergies      Recent Labs   Lab Test  08/05/16   0837  03/08/13   0846  10/07/11   0835   LDL  140*   --   164*   HDL  49   --   42   TRIG  88   --   78   CR   --   0.90   --    GFRESTIMATED   --   >90   --    GFRESTBLACK   --   >90   --    POTASSIUM   --   4.7   --       BP Readings from Last 3 Encounters:   03/16/18 113/71   12/28/17 126/74   09/25/17 110/70    Wt Readings from Last 3 Encounters:   03/16/18 193 lb (87.5 kg)   12/28/17 201 lb (91.2 kg)   09/25/17 196 lb (88.9 kg)          Ros: no chest pain, chest tightness  "  No sob   No leg edema   No abdominal pain     Denies fever or chills   Weight stable   No twitching     Nothing new medication   His medication are out of synch now due to the problems filling them last time      Patient has guns and a gun safe    Reviewed and updated as needed this visit by clinical staff  Tobacco  Allergies  Meds  Med Hx  Surg Hx  Fam Hx  Soc Hx      Reviewed and updated as needed this visit by Provider         O; /71 (BP Location: Left arm, Patient Position: Sitting, Cuff Size: Adult Regular)  Pulse 72  Temp 97.9  F (36.6  C) (Oral)  Ht 6' 2.5\" (1.892 m)  Wt 193 lb (87.5 kg)  SpO2 99%  BMI 24.45 kg/m2      Head: Normocephalic, atraumatic.  Eyes: Conjunctiva clear, non icteric. PERRLA.  Ears: External ears and TMs normal BL.  Nose: Septum midline, nasal mucosa pink and moist. No discharge.  Mouth / Throat: Normal dentition.  No oral lesions. Pharynx non erythematous, tonsils without hypertrophy.  Neck: Supple, no enlarged LN, trachea midline.    Chest wall normal to inspection and palpation. Good excursion bilaterally. Lungs clear to auscultation. Good air movement bilaterally without rales, wheezes, or rhonchi.   Regular rate and  rhythm. S1 and S2 normal, no murmurs, clicks, gallops or rubs. No edema or JVD.    No tremor was found on the patient.      Assessment: ADD   Plan: Renew medication for 3 months recheck at the end of that time.  Patient will check with his pharmacy and make sure that he can get all of his prescriptions renewed at the same time.        "

## 2018-06-14 ENCOUNTER — OFFICE VISIT (OUTPATIENT)
Dept: FAMILY MEDICINE | Facility: CLINIC | Age: 38
End: 2018-06-14
Payer: COMMERCIAL

## 2018-06-14 VITALS
HEART RATE: 63 BPM | DIASTOLIC BLOOD PRESSURE: 74 MMHG | WEIGHT: 198 LBS | TEMPERATURE: 97.8 F | SYSTOLIC BLOOD PRESSURE: 123 MMHG | BODY MASS INDEX: 25.08 KG/M2 | OXYGEN SATURATION: 98 %

## 2018-06-14 DIAGNOSIS — F98.8 ATTENTION DEFICIT DISORDER (ADD) WITHOUT HYPERACTIVITY: ICD-10-CM

## 2018-06-14 DIAGNOSIS — N52.8 OTHER MALE ERECTILE DYSFUNCTION: ICD-10-CM

## 2018-06-14 PROCEDURE — 99213 OFFICE O/P EST LOW 20 MIN: CPT | Performed by: FAMILY MEDICINE

## 2018-06-14 RX ORDER — METHYLPHENIDATE HYDROCHLORIDE 30 MG/1
30 CAPSULE, EXTENDED RELEASE ORAL DAILY
Qty: 30 CAPSULE | Refills: 0 | Status: SHIPPED | OUTPATIENT
Start: 2018-07-15 | End: 2018-08-14

## 2018-06-14 RX ORDER — METHYLPHENIDATE HYDROCHLORIDE 30 MG/1
30 CAPSULE, EXTENDED RELEASE ORAL DAILY
Qty: 30 CAPSULE | Refills: 0 | Status: SHIPPED | OUTPATIENT
Start: 2018-06-14 | End: 2018-07-14

## 2018-06-14 RX ORDER — METHYLPHENIDATE HYDROCHLORIDE 30 MG/1
30 CAPSULE, EXTENDED RELEASE ORAL DAILY
Qty: 30 CAPSULE | Refills: 0 | Status: SHIPPED | OUTPATIENT
Start: 2018-08-15 | End: 2018-09-14

## 2018-06-14 RX ORDER — METHYLPHENIDATE HYDROCHLORIDE 20 MG/1
20 CAPSULE, EXTENDED RELEASE ORAL DAILY
Qty: 30 CAPSULE | Refills: 0 | Status: SHIPPED | OUTPATIENT
Start: 2018-06-14 | End: 2018-07-14

## 2018-06-14 RX ORDER — METHYLPHENIDATE HYDROCHLORIDE 20 MG/1
20 CAPSULE, EXTENDED RELEASE ORAL DAILY
Qty: 30 CAPSULE | Refills: 0 | Status: SHIPPED | OUTPATIENT
Start: 2018-07-15 | End: 2018-08-14

## 2018-06-14 RX ORDER — METHYLPHENIDATE HYDROCHLORIDE 20 MG/1
20 CAPSULE, EXTENDED RELEASE ORAL DAILY
Qty: 30 CAPSULE | Refills: 0 | Status: SHIPPED | OUTPATIENT
Start: 2018-08-15 | End: 2018-09-14

## 2018-06-14 RX ORDER — SILDENAFIL 100 MG/1
50-100 TABLET, FILM COATED ORAL DAILY PRN
Qty: 6 TABLET | Refills: 11 | Status: SHIPPED | OUTPATIENT
Start: 2018-06-14 | End: 2019-08-19

## 2018-06-14 NOTE — PROGRESS NOTES
SUBJECTIVE:   Tommy Best is a 38 year old male who presents to clinic today for the following health issues:      Medication Follow up and Refill of Methylphenidate and Viagra    Taking Medication as prescribed: yes    Side Effects:  None    Medication Helping Symptoms:  yes     Problem list and histories reviewed & adjusted, as indicated.    He is going to [place]     Reviewed and updated as needed this visit by clinical staff       Reviewed and updated as needed this visit by Provider       Neck pain   Going to physical therapy   Getting better   Current Outpatient Prescriptions   Medication Sig Dispense Refill     methylphenidate (METADATE CD) 20 MG CR capsule Take 1 capsule (20 mg) by mouth daily 30 capsule 0     methylphenidate (METADATE CD) 20 MG CR capsule Take 1 capsule (20 mg) by mouth daily 30 capsule 0     methylphenidate (METADATE CD) 30 MG CR capsule Take 1 capsule (30 mg) by mouth daily 30 capsule 0     methylphenidate (METADATE CD) 30 MG CR capsule Take 1 capsule (30 mg) by mouth daily 30 capsule 0     sildenafil (VIAGRA) 100 MG cap/tab Take 0.5-1 tablets ( mg) by mouth daily as needed for erectile dysfunction Take 30 min to 4 hours before intercourse.  Never use with nitroglycerin, terazosin or doxazosin. 6 tablet 11     O: /74 (BP Location: Left arm, Patient Position: Sitting, Cuff Size: Adult Regular)  Pulse 63  Temp 97.8  F (36.6  C) (Oral)  Wt 198 lb (89.8 kg)  SpO2 98%  BMI 25.08 kg/m2      Chest wall normal to inspection and palpation. Good excursion bilaterally. Lungs clear to auscultation. Good air movement bilaterally without rales, wheezes, or rhonchi.   Regular rate and  rhythm. S1 and S2 normal, no murmurs, clicks, gallops or rubs. No edema or JVD.        ICD-10-CM    1. Attention deficit disorder (ADD) without hyperactivity F98.8 methylphenidate (METADATE CD) 30 MG CR capsule     methylphenidate (METADATE CD) 30 MG CR capsule     methylphenidate (METADATE CD) 30 MG  CR capsule     methylphenidate (METADATE CD) 20 MG CR capsule     methylphenidate (METADATE CD) 20 MG CR capsule     methylphenidate (METADATE CD) 20 MG CR capsule   2. Other male erectile dysfunction N52.8 sildenafil (VIAGRA) 100 MG tablet     Follow up in three months   Having no side effects from medication.  Medications have been effective for the patient  He continues to be able to maintain his current job and feels he is effective with the use of his medication.  He continues to use the same dose of 30 mg in the morning and 20 mg in the afternoon

## 2018-06-14 NOTE — MR AVS SNAPSHOT
After Visit Summary   6/14/2018    Tommy Best    MRN: [id number]           Patient Information     Date Of Birth          1980        Visit Information        Provider Department      6/14/2018 3:20 PM Moose Parson MD [facility]        Today's Diagnoses     Attention deficit disorder (ADD) without hyperactivity        Other male erectile dysfunction           Follow-ups after your visit        Who to contact     If you have questions or need follow up information about today's clinic visit or your schedule please contact [facility] directly at [phone].  Normal or non-critical lab and imaging results will be communicated to you by [employer], letter or phone within 4 business days after the clinic has received the results. If you do not hear from us within 7 days, please contact the clinic through [employer] or phone. If you have a critical or abnormal lab result, we will notify you by phone as soon as possible.  Submit refill requests through [employer] or call your pharmacy and they will forward the refill request to us. Please allow 3 business days for your refill to be completed.          Additional Information About Your Visit        MyChart Information     [employer] gives you secure access to your electronic health record. If you see a primary care provider, you can also send messages to your care team and make appointments. If you have questions, please call your primary care clinic.  If you do not have a primary care provider, please call [phone] and they will assist you.        Care EveryWhere ID     This is your Care EveryWhere ID. This could be used by other organizations to access your [place] medical records  [id number]        Your Vitals Were     Pulse Temperature Pulse Oximetry BMI (Body Mass Index)          63 97.8  F (36.6  C) (Oral) 98% 25.08 kg/m2         Blood Pressure from Last 3 Encounters:   06/14/18 123/74    03/16/18 113/71   12/28/17 126/74    Weight from Last 3 Encounters:   06/14/18 198 lb (89.8 kg)   03/16/18 193 lb (87.5 kg)   12/28/17 201 lb (91.2 kg)              Today, you had the following     No orders found for display         Today's Medication Changes          These changes are accurate as of 6/14/18  4:13 PM.  If you have any questions, ask your nurse or doctor.               These medicines have changed or have updated prescriptions.        Dose/Directions    * methylphenidate 20 MG CR capsule   Commonly known as:  METADATE CD   This may have changed:    - Another medication with the same name was added. Make sure you understand how and when to take each.  - Another medication with the same name was changed. Make sure you understand how and when to take each.  - Another medication with the same name was removed. Continue taking this medication, and follow the directions you see here.   Used for:  Attention deficit disorder (ADD) without hyperactivity   Changed by:  Moose Parson MD        Dose:  20 mg   Take 1 capsule (20 mg) by mouth daily   Quantity:  30 capsule   Refills:  0       * methylphenidate 30 MG CR capsule   Commonly known as:  METADATE CD   This may have changed:    - Another medication with the same name was added. Make sure you understand how and when to take each.  - Another medication with the same name was changed. Make sure you understand how and when to take each.  - Another medication with the same name was removed. Continue taking this medication, and follow the directions you see here.   Used for:  Attention deficit disorder (ADD) without hyperactivity   Changed by:  Moose Parson MD        Dose:  30 mg   Take 1 capsule (30 mg) by mouth daily   Quantity:  30 capsule   Refills:  0       * methylphenidate 30 MG CR capsule   Commonly known as:  METADATE CD   This may have changed:    - Another medication with the same name was added. Make sure you understand how and  when to take each.  - Another medication with the same name was changed. Make sure you understand how and when to take each.  - Another medication with the same name was removed. Continue taking this medication, and follow the directions you see here.   Used for:  Attention deficit disorder (ADD) without hyperactivity   Changed by:  Moose Parson MD        Dose:  30 mg   Take 1 capsule (30 mg) by mouth daily   Quantity:  30 capsule   Refills:  0       * methylphenidate 20 MG CR capsule   Commonly known as:  METADATE CD   This may have changed:    - Another medication with the same name was added. Make sure you understand how and when to take each.  - Another medication with the same name was changed. Make sure you understand how and when to take each.  - Another medication with the same name was removed. Continue taking this medication, and follow the directions you see here.   Used for:  Attention deficit disorder (ADD) without hyperactivity   Changed by:  Moose Parson MD        Dose:  20 mg   Take 1 capsule (20 mg) by mouth daily   Quantity:  30 capsule   Refills:  0       * methylphenidate 30 MG CR capsule   Commonly known as:  METADATE CD   This may have changed:    - Another medication with the same name was added. Make sure you understand how and when to take each.  - Another medication with the same name was changed. Make sure you understand how and when to take each.  - Another medication with the same name was removed. Continue taking this medication, and follow the directions you see here.   Used for:  Attention deficit disorder (ADD) without hyperactivity   Changed by:  Moose Parson MD        Dose:  30 mg   Take 1 capsule (30 mg) by mouth daily   Quantity:  30 capsule   Refills:  0       * methylphenidate 20 MG CR capsule   Commonly known as:  METADATE CD   This may have changed:  You were already taking a medication with the same name, and this prescription was added. Make sure  you understand how and when to take each.   Used for:  Attention deficit disorder (ADD) without hyperactivity   Replaces:  methylphenidate 30 MG Cp24   Changed by:  Moose Parson MD        Dose:  20 mg   Take 1 capsule (20 mg) by mouth daily   Quantity:  30 capsule   Refills:  0       * methylphenidate 30 MG CR capsule   Commonly known as:  METADATE CD   This may have changed:    - when to take this  - These instructions start on 7/15/2018. If you are unsure what to do until then, ask your doctor or other care provider.  - Another medication with the same name was removed. Continue taking this medication, and follow the directions you see here.   Used for:  Attention deficit disorder (ADD) without hyperactivity   Changed by:  Moose Parson MD        Dose:  30 mg   Start taking on:  7/15/2018   Take 1 capsule (30 mg) by mouth daily   Quantity:  30 capsule   Refills:  0       * methylphenidate 20 MG CR capsule   Commonly known as:  METADATE CD   This may have changed:  You were already taking a medication with the same name, and this prescription was added. Make sure you understand how and when to take each.   Used for:  Attention deficit disorder (ADD) without hyperactivity   Changed by:  Moose Parson MD        Dose:  20 mg   Start taking on:  7/15/2018   Take 1 capsule (20 mg) by mouth daily   Quantity:  30 capsule   Refills:  0       * methylphenidate 30 MG CR capsule   Commonly known as:  METADATE CD   This may have changed:    - medication strength  - how much to take  - These instructions start on 8/15/2018. If you are unsure what to do until then, ask your doctor or other care provider.  - Another medication with the same name was removed. Continue taking this medication, and follow the directions you see here.   Used for:  Attention deficit disorder (ADD) without hyperactivity   Changed by:  Moose Parson MD        Dose:  30 mg   Start taking on:  8/15/2018   Take 1 capsule (30  mg) by mouth daily   Quantity:  30 capsule   Refills:  0       * methylphenidate 20 MG CR capsule   Commonly known as:  METADATE CD   This may have changed:  You were already taking a medication with the same name, and this prescription was added. Make sure you understand how and when to take each.   Used for:  Attention deficit disorder (ADD) without hyperactivity   Changed by:  Moose Parson MD        Dose:  20 mg   Start taking on:  8/15/2018   Take 1 capsule (20 mg) by mouth daily   Quantity:  30 capsule   Refills:  0       sildenafil 100 MG tablet   Commonly known as:  VIAGRA   This may have changed:  reasons to take this   Used for:  Other male erectile dysfunction   Changed by:  Moose Parson MD        Dose:   mg   Take 0.5-1 tablets ( mg) by mouth daily as needed Take 30 min to 4 hours before intercourse.  Never use with nitroglycerin, terazosin or doxazosin.   Quantity:  6 tablet   Refills:  11       * Notice:  This list has 10 medication(s) that are the same as other medications prescribed for you. Read the directions carefully, and ask your doctor or other care provider to review them with you.      Stop taking these medicines if you haven't already. Please contact your care team if you have questions.     methylphenidate 30 MG Cp24   Commonly known as:  RITALIN LA   Replaced by:  methylphenidate 20 MG CR capsule   You also have another medication with the same name that you need to continue taking as instructed.   Stopped by:  Moose Parson MD                Where to get your medicines      Some of these will need a paper prescription and others can be bought over the counter.  Ask your nurse if you have questions.     Bring a paper prescription for each of these medications     methylphenidate 20 MG CR capsule    methylphenidate 20 MG CR capsule    methylphenidate 20 MG CR capsule    methylphenidate 30 MG CR capsule    methylphenidate 30 MG CR capsule     methylphenidate 30 MG CR capsule    sildenafil 100 MG tablet                Primary Care Provider Office Phone # Fax #    Moose Parson [phone]       [address] [place] [address]        Equal Access to Services     GOMEZ ERIC : Hadii wesly lucio hadavo Soomaali, waaxda luqadaha, qaybta kaalmada adeegyada, neel radhain hayrenettan zeferinosary stevensonmitchell castillo. So [facility] [phone].    ATENCIÓN: Si habla español, tiene a brady disposición servicios gratuitos de asistencia lingüística. Llame al [phone].    We comply with applicable federal civil rights laws and [place] laws. We do not discriminate on the basis of race, color, national origin, age, disability, sex, sexual orientation, or gender identity.            Thank you!     Thank you for choosing [facility]  for your care. Our goal is always to provide you with excellent care. Hearing back from our patients is one way we can continue to improve our services. Please take a few minutes to complete the written survey that you may receive in the mail after your visit with us. Thank you!             Your Updated Medication List - Protect others around you: Learn how to safely use, store and throw away your medicines at www.disposemymeds.org.          This list is accurate as of 6/14/18  4:13 PM.  Always use your most recent med list.                   Brand Name Dispense Instructions for use Diagnosis    * methylphenidate 20 MG CR capsule    METADATE CD    30 capsule    Take 1 capsule (20 mg) by mouth daily    Attention deficit disorder (ADD) without hyperactivity       * methylphenidate 30 MG CR capsule    METADATE CD    30 capsule    Take 1 capsule (30 mg) by mouth daily    Attention deficit disorder (ADD) without hyperactivity       * methylphenidate 30 MG CR capsule    METADATE CD    30 capsule    Take 1 capsule (30 mg) by mouth daily    Attention deficit disorder (ADD) without hyperactivity       *  methylphenidate 20 MG CR capsule    METADATE CD    30 capsule    Take 1 capsule (20 mg) by mouth daily    Attention deficit disorder (ADD) without hyperactivity       * methylphenidate 30 MG CR capsule    METADATE CD    30 capsule    Take 1 capsule (30 mg) by mouth daily    Attention deficit disorder (ADD) without hyperactivity       * methylphenidate 20 MG CR capsule    METADATE CD    30 capsule    Take 1 capsule (20 mg) by mouth daily    Attention deficit disorder (ADD) without hyperactivity       * methylphenidate 30 MG CR capsule   Start taking on:  7/15/2018    METADATE CD    30 capsule    Take 1 capsule (30 mg) by mouth daily    Attention deficit disorder (ADD) without hyperactivity       * methylphenidate 20 MG CR capsule   Start taking on:  7/15/2018    METADATE CD    30 capsule    Take 1 capsule (20 mg) by mouth daily    Attention deficit disorder (ADD) without hyperactivity       * methylphenidate 30 MG CR capsule   Start taking on:  8/15/2018    METADATE CD    30 capsule    Take 1 capsule (30 mg) by mouth daily    Attention deficit disorder (ADD) without hyperactivity       * methylphenidate 20 MG CR capsule   Start taking on:  8/15/2018    METADATE CD    30 capsule    Take 1 capsule (20 mg) by mouth daily    Attention deficit disorder (ADD) without hyperactivity       sildenafil 100 MG tablet    VIAGRA    6 tablet    Take 0.5-1 tablets ( mg) by mouth daily as needed Take 30 min to 4 hours before intercourse.  Never use with nitroglycerin, terazosin or doxazosin.    Other male erectile dysfunction       * Notice:  This list has 10 medication(s) that are the same as other medications prescribed for you. Read the directions carefully, and ask your doctor or other care provider to review them with you.

## 2018-06-14 NOTE — NURSING NOTE
3 Prescription's of Methylphenidate 20 mg and 30 mg each was given to patient at office visit  Date:  6/14/18  Signature:  Ivonne Estrada MA

## 2018-09-14 ENCOUNTER — OFFICE VISIT (OUTPATIENT)
Dept: FAMILY MEDICINE | Facility: CLINIC | Age: 38
End: 2018-09-14
Payer: COMMERCIAL

## 2018-09-14 VITALS
SYSTOLIC BLOOD PRESSURE: 110 MMHG | TEMPERATURE: 97.4 F | HEART RATE: 80 BPM | WEIGHT: 194 LBS | DIASTOLIC BLOOD PRESSURE: 72 MMHG | BODY MASS INDEX: 24.57 KG/M2

## 2018-09-14 DIAGNOSIS — F98.8 ATTENTION DEFICIT DISORDER (ADD) WITHOUT HYPERACTIVITY: Primary | ICD-10-CM

## 2018-09-14 DIAGNOSIS — Z23 NEED FOR PROPHYLACTIC VACCINATION WITH TETANUS-DIPHTHERIA (TD): ICD-10-CM

## 2018-09-14 PROCEDURE — 99213 OFFICE O/P EST LOW 20 MIN: CPT | Performed by: FAMILY MEDICINE

## 2018-09-14 RX ORDER — METHYLPHENIDATE HYDROCHLORIDE 20 MG/1
20 CAPSULE, EXTENDED RELEASE ORAL DAILY
Qty: 30 CAPSULE | Refills: 0 | Status: SHIPPED | OUTPATIENT
Start: 2018-10-14 | End: 2018-12-20

## 2018-09-14 RX ORDER — METHYLPHENIDATE HYDROCHLORIDE 30 MG/1
30 CAPSULE, EXTENDED RELEASE ORAL DAILY
Qty: 30 CAPSULE | Refills: 0 | Status: SHIPPED | OUTPATIENT
Start: 2018-10-14 | End: 2018-12-20

## 2018-09-14 RX ORDER — METHYLPHENIDATE HYDROCHLORIDE 30 MG/1
30 CAPSULE, EXTENDED RELEASE ORAL DAILY
Qty: 30 CAPSULE | Refills: 0 | Status: SHIPPED | OUTPATIENT
Start: 2018-09-14 | End: 2018-09-14

## 2018-09-14 RX ORDER — METHYLPHENIDATE HYDROCHLORIDE 20 MG/1
20 CAPSULE, EXTENDED RELEASE ORAL DAILY
Qty: 30 CAPSULE | Refills: 0 | Status: SHIPPED | OUTPATIENT
Start: 2018-09-14 | End: 2018-09-14

## 2018-09-14 RX ORDER — METHYLPHENIDATE HYDROCHLORIDE 30 MG/1
30 CAPSULE, EXTENDED RELEASE ORAL DAILY
Qty: 30 CAPSULE | Refills: 0 | Status: SHIPPED | OUTPATIENT
Start: 2018-11-14 | End: 2018-12-20

## 2018-09-14 RX ORDER — METHYLPHENIDATE HYDROCHLORIDE 20 MG/1
20 CAPSULE, EXTENDED RELEASE ORAL DAILY
Qty: 30 CAPSULE | Refills: 0 | Status: SHIPPED | OUTPATIENT
Start: 2018-11-14 | End: 2018-12-20

## 2018-09-14 NOTE — PROGRESS NOTES
SUBJECTIVE:   Tommy Best is a 38 year old male who presents to clinic today for the following health issues:      Medication Followup of Methylphenidate    Taking Medication as prescribed: yes    Side Effects:  None    Medication Helping Symptoms:  yes     Ros: no chest pain, chest tightness   No sob   No leg edema   No abdominal pain     appetite is ok     Increased running   Hurt right leg behind the knee   Denies fever or chills   Weight stable       O: /72 (BP Location: Right arm, Patient Position: Sitting, Cuff Size: Adult Regular)  Pulse 80  Temp 97.4  F (36.3  C) (Oral)  Wt 194 lb (88 kg)  BMI 24.57 kg/m2     Chest wall normal to inspection and palpation. Good excursion bilaterally. Lungs clear to auscultation. Good air movement bilaterally without rales, wheezes, or rhonchi.   Regular rate and  rhythm. S1 and S2 normal, no murmurs, clicks, gallops or rubs. No edema or JVD.    The abdomen is soft without tenderness, guarding, mass, rebound or organomegaly. Bowel sounds are normal. No CVA tenderness or inguinal adenopathy noted.    Right knee has no pain at the joint line   No pain with movement     Pain in the anserine bursa area       ICD-10-CM    1. Attention deficit disorder (ADD) without hyperactivity F98.8 methylphenidate (METADATE CD) 20 MG CR capsule     methylphenidate (METADATE CD) 30 MG CR capsule     methylphenidate (METADATE CD) 30 MG CR capsule     methylphenidate (METADATE CD) 20 MG CR capsule     DISCONTINUED: methylphenidate (METADATE CD) 20 MG CR capsule     DISCONTINUED: methylphenidate (METADATE CD) 30 MG CR capsule   2. Need for prophylactic vaccination with tetanus-diphtheria (TD) Z23      Recheck in 3 months   Knee pain seems to be a hamstring strain   No specific treatment needed other than decreased activity

## 2018-09-14 NOTE — MR AVS SNAPSHOT
After Visit Summary   9/14/2018    Tommy Best    MRN: [id number]           Patient Information     Date Of Birth          1980        Visit Information        Provider Department      9/14/2018 8:00 AM Moose Parson MD [facility]        Today's Diagnoses     Attention deficit disorder (ADD) without hyperactivity    -  1    Need for prophylactic vaccination with tetanus-diphtheria (TD)           Follow-ups after your visit        Who to contact     If you have questions or need follow up information about today's clinic visit or your schedule please contact [facility] directly at [phone].  Normal or non-critical lab and imaging results will be communicated to you by [employer], letter or phone within 4 business days after the clinic has received the results. If you do not hear from us within 7 days, please contact the clinic through [employer] or phone. If you have a critical or abnormal lab result, we will notify you by phone as soon as possible.  Submit refill requests through [employer] or call your pharmacy and they will forward the refill request to us. Please allow 3 business days for your refill to be completed.          Additional Information About Your Visit        MyChart Information     [employer] gives you secure access to your electronic health record. If you see a primary care provider, you can also send messages to your care team and make appointments. If you have questions, please call your primary care clinic.  If you do not have a primary care provider, please call [phone] and they will assist you.        Care EveryWhere ID     This is your Care EveryWhere ID. This could be used by other organizations to access your [place] medical records  [id number]        Your Vitals Were     Pulse Temperature BMI (Body Mass Index)             80 97.4  F (36.3  C) (Oral) 24.57 kg/m2          Blood Pressure from Last 3 Encounters:    09/14/18 110/72   06/14/18 123/74   03/16/18 113/71    Weight from Last 3 Encounters:   09/14/18 194 lb (88 kg)   06/14/18 198 lb (89.8 kg)   03/16/18 193 lb (87.5 kg)              Today, you had the following     No orders found for display         Today's Medication Changes          These changes are accurate as of 9/14/18  8:33 AM.  If you have any questions, ask your nurse or doctor.               These medicines have changed or have updated prescriptions.        Dose/Directions    * methylphenidate 20 MG CR capsule   Commonly known as:  METADATE CD   This may have changed:  These instructions start on 10/14/2018. If you are unsure what to do until then, ask your doctor or other care provider.   Used for:  Attention deficit disorder (ADD) without hyperactivity   Changed by:  Moose Parson MD        Dose:  20 mg   Start taking on:  10/14/2018   Take 1 capsule (20 mg) by mouth daily   Quantity:  30 capsule   Refills:  0       * methylphenidate 30 MG CR capsule   Commonly known as:  METADATE CD   This may have changed:  These instructions start on 10/14/2018. If you are unsure what to do until then, ask your doctor or other care provider.   Used for:  Attention deficit disorder (ADD) without hyperactivity   Changed by:  Moose Parson MD        Dose:  30 mg   Start taking on:  10/14/2018   Take 1 capsule (30 mg) by mouth daily   Quantity:  30 capsule   Refills:  0       * methylphenidate 30 MG CR capsule   Commonly known as:  METADATE CD   This may have changed:  You were already taking a medication with the same name, and this prescription was added. Make sure you understand how and when to take each.   Used for:  Attention deficit disorder (ADD) without hyperactivity   Changed by:  Moose Parson MD        Dose:  30 mg   Start taking on:  11/14/2018   Take 1 capsule (30 mg) by mouth daily   Quantity:  30 capsule   Refills:  0       * methylphenidate 20 MG CR capsule   Commonly known as:   METADATE CD   This may have changed:  You were already taking a medication with the same name, and this prescription was added. Make sure you understand how and when to take each.   Used for:  Attention deficit disorder (ADD) without hyperactivity   Changed by:  Moose Parson MD        Dose:  20 mg   Start taking on:  11/14/2018   Take 1 capsule (20 mg) by mouth daily   Quantity:  30 capsule   Refills:  0       * Notice:  This list has 4 medication(s) that are the same as other medications prescribed for you. Read the directions carefully, and ask your doctor or other care provider to review them with you.         Where to get your medicines      Some of these will need a paper prescription and others can be bought over the counter.  Ask your nurse if you have questions.     Bring a paper prescription for each of these medications     methylphenidate 20 MG CR capsule    methylphenidate 20 MG CR capsule    methylphenidate 30 MG CR capsule    methylphenidate 30 MG CR capsule                Primary Care Provider Office Phone # Fax #    Moose Parson [phone]       [address] [facility] [address]        Equal Access to Services     [facility]: Hadii wesly lucio hadasho Sodonnell, waaxda luqadaha, qaybta kaalmada adesaryyaruby, neel mccracken . So [facility] [phone].    ATENCIÓN: Si habla español, tiene a brady disposición servicios gratuitos de asistencia lingüística. Llame al [phone].    We comply with applicable federal civil rights laws and [place] laws. We do not discriminate on the basis of race, color, national origin, age, disability, sex, sexual orientation, or gender identity.            Thank you!     Thank you for choosing [facility]  for your care. Our goal is always to provide you with excellent care. Hearing back from our patients is one way we can continue to improve our services. Please take a few minutes to  complete the written survey that you may receive in the mail after your visit with us. Thank you!             Your Updated Medication List - Protect others around you: Learn how to safely use, store and throw away your medicines at www.disposemymeds.org.          This list is accurate as of 9/14/18  8:33 AM.  Always use your most recent med list.                   Brand Name Dispense Instructions for use Diagnosis    * methylphenidate 20 MG CR capsule   Start taking on:  10/14/2018    METADATE CD    30 capsule    Take 1 capsule (20 mg) by mouth daily    Attention deficit disorder (ADD) without hyperactivity       * methylphenidate 30 MG CR capsule   Start taking on:  10/14/2018    METADATE CD    30 capsule    Take 1 capsule (30 mg) by mouth daily    Attention deficit disorder (ADD) without hyperactivity       * methylphenidate 30 MG CR capsule   Start taking on:  11/14/2018    METADATE CD    30 capsule    Take 1 capsule (30 mg) by mouth daily    Attention deficit disorder (ADD) without hyperactivity       * methylphenidate 20 MG CR capsule   Start taking on:  11/14/2018    METADATE CD    30 capsule    Take 1 capsule (20 mg) by mouth daily    Attention deficit disorder (ADD) without hyperactivity       sildenafil 100 MG tablet    VIAGRA    6 tablet    Take 0.5-1 tablets ( mg) by mouth daily as needed Take 30 min to 4 hours before intercourse.  Never use with nitroglycerin, terazosin or doxazosin.    Other male erectile dysfunction       * Notice:  This list has 4 medication(s) that are the same as other medications prescribed for you. Read the directions carefully, and ask your doctor or other care provider to review them with you.

## 2018-09-14 NOTE — NURSING NOTE
Patient given printed RX for Methylphenidate for 20 mg and 30mg for Sept 14, 2018, Oct 14, 2018, and November 14,2018 per Dr Parson.    Shawna Andrade, [facility]  8:37 AM September 14, 2018

## 2018-10-12 ENCOUNTER — OFFICE VISIT (OUTPATIENT)
Dept: FAMILY MEDICINE | Facility: CLINIC | Age: 38
End: 2018-10-12
Payer: COMMERCIAL

## 2018-10-12 VITALS
SYSTOLIC BLOOD PRESSURE: 115 MMHG | BODY MASS INDEX: 24.5 KG/M2 | DIASTOLIC BLOOD PRESSURE: 75 MMHG | OXYGEN SATURATION: 97 % | TEMPERATURE: 98 F | HEART RATE: 77 BPM | WEIGHT: 193.4 LBS

## 2018-10-12 DIAGNOSIS — J01.00 ACUTE NON-RECURRENT MAXILLARY SINUSITIS: Primary | ICD-10-CM

## 2018-10-12 PROCEDURE — 99213 OFFICE O/P EST LOW 20 MIN: CPT | Performed by: PHYSICIAN ASSISTANT

## 2018-10-12 RX ORDER — FLUTICASONE PROPIONATE 50 MCG
2 SPRAY, SUSPENSION (ML) NASAL DAILY
Qty: 1 BOTTLE | Refills: 1 | Status: SHIPPED | OUTPATIENT
Start: 2018-10-12 | End: 2018-12-18

## 2018-10-12 NOTE — MR AVS SNAPSHOT
After Visit Summary   10/12/2018    Tommy Best    MRN: [id number]           Patient Information     Date Of Birth          1980        Visit Information        Provider Department      10/12/2018 7:40 AM Leidy Kwan PA-C [facility]        Today's Diagnoses     Acute non-recurrent maxillary sinusitis    -  1      Care Instructions    If not improving return to clinic           Follow-ups after your visit        Follow-up notes from your care team     Return in about 7 days (around 10/19/2018) for follow up only as needed if symptoms worsen.      Who to contact     If you have questions or need follow up information about today's clinic visit or your schedule please contact [facility] directly at [phone].  Normal or non-critical lab and imaging results will be communicated to you by MyChart, letter or phone within 4 business days after the clinic has received the results. If you do not hear from us within 7 days, please contact the clinic through [employer] or phone. If you have a critical or abnormal lab result, we will notify you by phone as soon as possible.  Submit refill requests through [employer] or call your pharmacy and they will forward the refill request to us. Please allow 3 business days for your refill to be completed.          Additional Information About Your Visit        MyChart Information     [employer] gives you secure access to your electronic health record. If you see a primary care provider, you can also send messages to your care team and make appointments. If you have questions, please call your primary care clinic.  If you do not have a primary care provider, please call [phone] and they will assist you.        Care EveryWhere ID     This is your Care EveryWhere ID. This could be used by other organizations to access your [place] medical records  [id number]        Your Vitals Were     Pulse Temperature Pulse Oximetry  BMI (Body Mass Index)          77 98  F (36.7  C) (Oral) 97% 24.5 kg/m2         Blood Pressure from Last 3 Encounters:   10/12/18 115/75   09/14/18 110/72   06/14/18 123/74    Weight from Last 3 Encounters:   10/12/18 193 lb 6.4 oz (87.7 kg)   09/14/18 194 lb (88 kg)   06/14/18 198 lb (89.8 kg)              Today, you had the following     No orders found for display         Today's Medication Changes          These changes are accurate as of 10/12/18  8:03 AM.  If you have any questions, ask your nurse or doctor.               Start taking these medicines.        Dose/Directions    amoxicillin-clavulanate 875-125 MG per tablet   Commonly known as:  AUGMENTIN   Used for:  Acute non-recurrent maxillary sinusitis   Started by:  Leidy Kwan PA-C        Dose:  1 tablet   Take 1 tablet by mouth 2 times daily   Quantity:  20 tablet   Refills:  0       fluticasone 50 MCG/ACT spray   Commonly known as:  FLONASE   Used for:  Acute non-recurrent maxillary sinusitis   Started by:  Leidy Kwan PA-C        Dose:  2 spray   Spray 2 sprays into both nostrils daily   Quantity:  1 Bottle   Refills:  1            Where to get your medicines      These medications were sent to [facility]/pharmacy #[id number] - [address] AT CORNER OF [address] [place] [address]     Phone:  [phone]     amoxicillin-clavulanate 875-125 MG per tablet    fluticasone 50 MCG/ACT spray                Primary Care Provider Office Phone # Fax #    Moose Parson [phone] [address] [facility] [address]        Equal Access to Services     [facility]: Elif Kearney, skylar nair, socorro kaalmada cristopher, neel castillo. So [facility] [phone].    ATENCIÓN: Si habla español, tiene a brady disposición servicios gratuitos de asistencia lingüística. Llame al [phone].    We comply with applicable federal civil rights laws and  [place] laws. We do not discriminate on the basis of race, color, national origin, age, disability, sex, sexual orientation, or gender identity.            Thank you!     Thank you for choosing [facility]  for your care. Our goal is always to provide you with excellent care. Hearing back from our patients is one way we can continue to improve our services. Please take a few minutes to complete the written survey that you may receive in the mail after your visit with us. Thank you!             Your Updated Medication List - Protect others around you: Learn how to safely use, store and throw away your medicines at www.disposemymeds.org.          This list is accurate as of 10/12/18  8:03 AM.  Always use your most recent med list.                   Brand Name Dispense Instructions for use Diagnosis    amoxicillin-clavulanate 875-125 MG per tablet    AUGMENTIN    20 tablet    Take 1 tablet by mouth 2 times daily    Acute non-recurrent maxillary sinusitis       fluticasone 50 MCG/ACT spray    FLONASE    1 Bottle    Spray 2 sprays into both nostrils daily    Acute non-recurrent maxillary sinusitis       * methylphenidate 20 MG CR capsule   Start taking on:  10/14/2018    METADATE CD    30 capsule    Take 1 capsule (20 mg) by mouth daily    Attention deficit disorder (ADD) without hyperactivity       * methylphenidate 30 MG CR capsule   Start taking on:  10/14/2018    METADATE CD    30 capsule    Take 1 capsule (30 mg) by mouth daily    Attention deficit disorder (ADD) without hyperactivity       * methylphenidate 30 MG CR capsule   Start taking on:  11/14/2018    METADATE CD    30 capsule    Take 1 capsule (30 mg) by mouth daily    Attention deficit disorder (ADD) without hyperactivity       * methylphenidate 20 MG CR capsule   Start taking on:  11/14/2018    METADATE CD    30 capsule    Take 1 capsule (20 mg) by mouth daily    Attention deficit disorder (ADD) without hyperactivity        sildenafil 100 MG tablet    VIAGRA    6 tablet    Take 0.5-1 tablets ( mg) by mouth daily as needed Take 30 min to 4 hours before intercourse.  Never use with nitroglycerin, terazosin or doxazosin.    Other male erectile dysfunction       * Notice:  This list has 4 medication(s) that are the same as other medications prescribed for you. Read the directions carefully, and ask your doctor or other care provider to review them with you.

## 2018-10-12 NOTE — PROGRESS NOTES
SUBJECTIVE:   Tommy Best is a 38 year old male who presents to clinic today for the following health issues:      Acute Illness   Acute illness concerns: URI  Onset: x 2.5 weeks    Fever: no    Chills/Sweats: no    Headache (location?): YES    Sinus Pressure:YES- post-nasal drainage and teeth pain    Conjunctivitis:  no    Ear Pain: YES: both    Rhinorrhea: YES    Congestion: YES    Sore Throat: no     Cough: YES-productive of sputum- only a few times    Wheeze: no    Decreased Appetite: no    Nausea: no    Vomiting: no    Diarrhea:  no    Dysuria/Freq.: no    Fatigue/Achiness: YES    Sick/Strep Exposure: YES- wife sinusitis and bronchitis.      Therapies Tried and outcome: OTC sudafed - 12 hour. Does help.         Problem list and histories reviewed & adjusted, as indicated.  Additional history: as documented    Patient Active Problem List   Diagnosis     Attention deficit disorder     Photosensitivity     Hyperlipidemia LDL goal <160     Past Surgical History:   Procedure Laterality Date     NO HISTORY OF SURGERY         Social History   Substance Use Topics     Smoking status: Never Smoker     Smokeless tobacco: Never Used     Alcohol use Yes     Family History   Problem Relation Age of Onset     Family History Negative Mother      Alcohol/Drug Father      alcholol     Diabetes Father      Type 2     Chemical Addiction Father      Alcoholic     Hypertension Father      Anxiety Disorder Father      Substance Abuse Father      Thyroid Disease Father      Obesity Father      Diabetes Maternal Grandfather      Type 1     Diabetes Paternal Grandmother      Type 2     Cancer - colorectal Paternal Grandfather      Age 60     Hypertension Paternal Grandfather      Colon Cancer Paternal Grandfather            Reviewed and updated as needed this visit by clinical staff  Tobacco  Allergies  Meds  Problems  Med Hx  Surg Hx  Fam Hx  Soc Hx        Reviewed and updated as needed this visit by Provider  Allergies   Meds  Problems         ROS:  Constitutional, HEENT, cardiovascular, pulmonary, gi and gu systems are negative, except as otherwise noted.    OBJECTIVE:     /75 (BP Location: Right arm, Patient Position: Chair, Cuff Size: Adult Large)  Pulse 77  Temp 98  F (36.7  C) (Oral)  Wt 193 lb 6.4 oz (87.7 kg)  SpO2 97%  BMI 24.5 kg/m2  Body mass index is 24.5 kg/(m^2).  GENERAL: healthy, alert and no distress  HENT: ear canals and TM's normal, nose with nasal congestion and mouth without ulcers or lesions, maxillary sinus tenderness to palpation.   NECK: no adenopathy,  RESP: lungs clear to auscultation - no rales, rhonchi or wheezes  CV: regular rate and rhythm, normal S1 S2, no S3 or S4, no murmur, click or rub,     Diagnostic Test Results:  none     ASSESSMENT/PLAN:       ICD-10-CM    1. Acute non-recurrent maxillary sinusitis J01.00 amoxicillin-clavulanate (AUGMENTIN) 875-125 MG per tablet     fluticasone (FLONASE) 50 MCG/ACT spray   Persistent symptoms over 2 weeks. return to clinic if not improving. Sudafed and flonase as needed. Finish all antibiotics.     FUTURE APPOINTMENTS:       - Follow-up for annual visit or as needed    Leidy Kwan PA-C  [facility]

## 2018-12-18 DIAGNOSIS — J01.00 ACUTE NON-RECURRENT MAXILLARY SINUSITIS: ICD-10-CM

## 2018-12-18 RX ORDER — FLUTICASONE PROPIONATE 50 MCG
2 SPRAY, SUSPENSION (ML) NASAL DAILY
Qty: 16 ML | Refills: 1 | Status: SHIPPED | OUTPATIENT
Start: 2018-12-18 | End: 2022-12-06

## 2018-12-18 NOTE — TELEPHONE ENCOUNTER
"Requested Prescriptions   Pending Prescriptions Disp Refills     fluticasone (FLONASE) 50 MCG/ACT nasal spray [Pharmacy Med Name: FLUTICASONE PROP 50 MCG SPRAY] 16 mL 1    Last Written Prescription Date:  10/12/18  Last Fill Quantity: 1,  # refills: 1   Last office visit: 10/12/2018 with prescribing provider:     Future Office Visit:   Next 5 appointments (look out 90 days)    Dec 20, 2018  9:40 AM CST  Office Visit with Moose Parson MD  [facility] [address] [place] [id number]  [phone]          Sig: SPRAY 2 SPRAYS INTO BOTH NOSTRILS DAILY    Inhaled Steroids Protocol Passed - 12/18/2018  1:41 AM       Passed - Patient is age 12 or older       Passed - Recent (12 mo) or future (30 days) visit within the authorizing provider's specialty    Patient had office visit in the last 12 months or has a visit in the next 30 days with authorizing provider or within the authorizing provider's specialty.  See \"Patient Info\" tab in inbasket, or \"Choose Columns\" in Meds & Orders section of the refill encounter.                "

## 2018-12-18 NOTE — TELEPHONE ENCOUNTER
Routing refill request to provider for review/approval because:  Diagnosis is sinusitis.  Julieta Elaine, WILLIAN [facility] Triage.

## 2018-12-20 ENCOUNTER — OFFICE VISIT (OUTPATIENT)
Dept: FAMILY MEDICINE | Facility: CLINIC | Age: 38
End: 2018-12-20
Payer: COMMERCIAL

## 2018-12-20 VITALS
HEART RATE: 68 BPM | DIASTOLIC BLOOD PRESSURE: 79 MMHG | BODY MASS INDEX: 24.7 KG/M2 | WEIGHT: 195 LBS | SYSTOLIC BLOOD PRESSURE: 131 MMHG | TEMPERATURE: 97.2 F | OXYGEN SATURATION: 100 %

## 2018-12-20 DIAGNOSIS — F98.8 ATTENTION DEFICIT DISORDER (ADD) WITHOUT HYPERACTIVITY: ICD-10-CM

## 2018-12-20 PROCEDURE — 99213 OFFICE O/P EST LOW 20 MIN: CPT | Performed by: FAMILY MEDICINE

## 2018-12-20 RX ORDER — METHYLPHENIDATE HYDROCHLORIDE 20 MG/1
20 CAPSULE, EXTENDED RELEASE ORAL DAILY
Qty: 30 CAPSULE | Refills: 0 | Status: SHIPPED | OUTPATIENT
Start: 2018-02-20 | End: 2018-12-20

## 2018-12-20 RX ORDER — METHYLPHENIDATE HYDROCHLORIDE 30 MG/1
30 CAPSULE, EXTENDED RELEASE ORAL DAILY
Qty: 30 CAPSULE | Refills: 0 | Status: SHIPPED | OUTPATIENT
Start: 2018-01-20 | End: 2018-12-20

## 2018-12-20 RX ORDER — METHYLPHENIDATE HYDROCHLORIDE 20 MG/1
20 CAPSULE, EXTENDED RELEASE ORAL DAILY
Qty: 30 CAPSULE | Refills: 0 | Status: SHIPPED | OUTPATIENT
Start: 2018-12-20 | End: 2019-06-13

## 2018-12-20 RX ORDER — METHYLPHENIDATE HYDROCHLORIDE 30 MG/1
30 CAPSULE, EXTENDED RELEASE ORAL EVERY MORNING
Qty: 30 CAPSULE | Refills: 0 | Status: SHIPPED | OUTPATIENT
Start: 2019-01-20 | End: 2019-06-13

## 2018-12-20 RX ORDER — METHYLPHENIDATE HYDROCHLORIDE 30 MG/1
30 CAPSULE, EXTENDED RELEASE ORAL DAILY
Qty: 30 CAPSULE | Refills: 0 | Status: SHIPPED | OUTPATIENT
Start: 2018-02-20 | End: 2019-06-13

## 2018-12-20 RX ORDER — METHYLPHENIDATE HYDROCHLORIDE 30 MG/1
30 CAPSULE, EXTENDED RELEASE ORAL DAILY
Qty: 30 CAPSULE | Refills: 0 | Status: SHIPPED | OUTPATIENT
Start: 2018-12-20 | End: 2019-06-13

## 2018-12-20 RX ORDER — METHYLPHENIDATE HYDROCHLORIDE 20 MG/1
20 CAPSULE, EXTENDED RELEASE ORAL DAILY
Qty: 30 CAPSULE | Refills: 0 | Status: SHIPPED | OUTPATIENT
Start: 2019-02-20 | End: 2019-06-13

## 2018-12-20 RX ORDER — METHYLPHENIDATE HYDROCHLORIDE 30 MG/1
30 CAPSULE, EXTENDED RELEASE ORAL EVERY MORNING
Qty: 30 CAPSULE | Refills: 0 | Status: SHIPPED | OUTPATIENT
Start: 2019-02-20 | End: 2019-06-13

## 2018-12-20 RX ORDER — METHYLPHENIDATE HYDROCHLORIDE 30 MG/1
30 CAPSULE, EXTENDED RELEASE ORAL DAILY
Qty: 30 CAPSULE | Refills: 0 | Status: SHIPPED | OUTPATIENT
Start: 2018-12-20 | End: 2018-12-20

## 2018-12-20 RX ORDER — METHYLPHENIDATE HYDROCHLORIDE 20 MG/1
20 CAPSULE, EXTENDED RELEASE ORAL DAILY
Qty: 30 CAPSULE | Refills: 0 | Status: SHIPPED | OUTPATIENT
Start: 2019-01-20 | End: 2018-12-20

## 2018-12-20 NOTE — PROGRESS NOTES
SUBJECTIVE:   Tommy Best is a 38 year old male who presents to clinic today for the following health issues:      Medication Followup of all meds    Taking Medication as prescribed: yes    Side Effects:  None    Medication Helping Symptoms:  yes       Cough x 3-4 days  and sore throat x 10 days     Sore throat is improving now   Pressure around the ears   No fever       Problem list and histories reviewed & adjusted, as indicated.  Additional history: as documented    Ros: no chest pain, chest tightness   No sob   No leg edema   No abdominal pain     Denies fever or chills   Weight stable     Feels worse in the winter due to dry air   Does yoga has gained a few pounds       O: /79 (BP Location: Left arm, Patient Position: Sitting, Cuff Size: Adult Regular)   Pulse 68   Temp 97.2  F (36.2  C) (Oral)   Wt 88.5 kg (195 lb)   SpO2 100%   BMI 24.70 kg/m      Wt Readings from Last 4 Encounters:   12/20/18 88.5 kg (195 lb)   10/12/18 87.7 kg (193 lb 6.4 oz)   09/14/18 88 kg (194 lb)   06/14/18 89.8 kg (198 lb)     Head: Normocephalic, atraumatic.  Eyes: Conjunctiva clear, non icteric. PERRLA.  Ears: External ears and TMs normal BL.  Nose: Septum midline, nasal mucosa pink and moist. No discharge.  Mouth / Throat: Normal dentition.  No oral lesions. Pharynx non erythematous, tonsils without hypertrophy.  Neck: Supple, no enlarged LN, trachea midline.    Chest wall normal to inspection and palpation. Good excursion bilaterally. Lungs clear to auscultation. Good air movement bilaterally without rales, wheezes, or rhonchi.   Regular rate and  rhythm. S1 and S2 normal, no murmurs, clicks, gallops or rubs. No edema or JVD.        ICD-10-CM    1. Attention deficit disorder (ADD) without hyperactivity F98.8 methylphenidate (METADATE CD) 30 MG CR capsule     methylphenidate (METADATE CD) 20 MG CR capsule     methylphenidate (METADATE CD) 20 MG CR capsule     methylphenidate (METADATE CD) 30 MG CR capsule      methylphenidate (METADATE CD) 30 MG CR capsule     methylphenidate (METADATE CD) 30 MG CR capsule     DISCONTINUED: methylphenidate (METADATE CD) 20 MG CR capsule     DISCONTINUED: methylphenidate (METADATE CD) 20 MG CR capsule     DISCONTINUED: methylphenidate (METADATE CD) 30 MG CR capsule     DISCONTINUED: methylphenidate (METADATE CD) 30 MG CR capsule         Reviewed and updated as needed this visit by clinical staff  Tobacco  Allergies  Meds  Med Hx  Surg Hx  Fam Hx  Soc Hx      Reviewed and updated as needed this visit by Provider

## 2018-12-20 NOTE — NURSING NOTE
Patient handed Rx's for Metadate 20mg for January 20, 2019,  20mg for Feb 20,2019,  30mg for December 20, 2019,   20mg for December 20th, 2018,  30mg January 20th, 2019, 30mg Feb 20th, 2019.

## 2019-04-04 ENCOUNTER — OFFICE VISIT (OUTPATIENT)
Dept: FAMILY MEDICINE | Facility: CLINIC | Age: 39
End: 2019-04-04
Payer: COMMERCIAL

## 2019-04-04 VITALS
TEMPERATURE: 98.4 F | WEIGHT: 187.75 LBS | HEIGHT: 76 IN | OXYGEN SATURATION: 98 % | HEART RATE: 76 BPM | DIASTOLIC BLOOD PRESSURE: 71 MMHG | SYSTOLIC BLOOD PRESSURE: 125 MMHG | BODY MASS INDEX: 22.86 KG/M2

## 2019-04-04 DIAGNOSIS — Z23 NEED FOR TETANUS BOOSTER: Primary | ICD-10-CM

## 2019-04-04 DIAGNOSIS — S86.911A MUSCLE STRAIN OF LOWER LEG, RIGHT, INITIAL ENCOUNTER: ICD-10-CM

## 2019-04-04 DIAGNOSIS — F98.8 ATTENTION DEFICIT DISORDER (ADD) WITHOUT HYPERACTIVITY: ICD-10-CM

## 2019-04-04 PROCEDURE — 99214 OFFICE O/P EST MOD 30 MIN: CPT | Mod: 25 | Performed by: FAMILY MEDICINE

## 2019-04-04 PROCEDURE — 90471 IMMUNIZATION ADMIN: CPT | Performed by: FAMILY MEDICINE

## 2019-04-04 PROCEDURE — 90714 TD VACC NO PRESV 7 YRS+ IM: CPT | Performed by: FAMILY MEDICINE

## 2019-04-04 RX ORDER — METHYLPHENIDATE HYDROCHLORIDE 20 MG/1
20 CAPSULE, EXTENDED RELEASE ORAL DAILY
Qty: 30 CAPSULE | Refills: 0 | Status: SHIPPED | OUTPATIENT
Start: 2019-06-05 | End: 2019-06-13

## 2019-04-04 RX ORDER — METHYLPHENIDATE HYDROCHLORIDE 20 MG/1
20 CAPSULE, EXTENDED RELEASE ORAL DAILY
Qty: 30 CAPSULE | Refills: 0 | Status: SHIPPED | OUTPATIENT
Start: 2019-05-05 | End: 2019-06-13

## 2019-04-04 RX ORDER — METHYLPHENIDATE HYDROCHLORIDE 30 MG/1
30 CAPSULE, EXTENDED RELEASE ORAL DAILY
Qty: 30 CAPSULE | Refills: 0 | Status: SHIPPED | OUTPATIENT
Start: 2019-04-04 | End: 2019-06-13

## 2019-04-04 RX ORDER — METHYLPHENIDATE HYDROCHLORIDE 30 MG/1
30 CAPSULE, EXTENDED RELEASE ORAL DAILY
Qty: 30 CAPSULE | Refills: 0 | Status: SHIPPED | OUTPATIENT
Start: 2019-06-05 | End: 2019-06-13

## 2019-04-04 RX ORDER — METHYLPHENIDATE HYDROCHLORIDE 20 MG/1
20 CAPSULE, EXTENDED RELEASE ORAL DAILY
Qty: 30 CAPSULE | Refills: 0 | Status: SHIPPED | OUTPATIENT
Start: 2019-04-04 | End: 2019-06-13

## 2019-04-04 RX ORDER — METHYLPHENIDATE HYDROCHLORIDE 30 MG/1
30 CAPSULE, EXTENDED RELEASE ORAL DAILY
Qty: 30 CAPSULE | Refills: 0 | Status: SHIPPED | OUTPATIENT
Start: 2019-05-05 | End: 2019-06-13

## 2019-04-04 ASSESSMENT — MIFFLIN-ST. JEOR: SCORE: 1879.13

## 2019-04-04 NOTE — NURSING NOTE
Screening Questionnaire for Adult Immunization    Are you sick today?   No   Do you have allergies to medications, food, a vaccine component or latex?   No   Have you ever had a serious reaction after receiving a vaccination?   No   Do you have a long-term health problem with heart disease, lung disease, asthma, kidney disease, metabolic disease (e.g. diabetes), anemia, or other blood disorder?   No   Do you have cancer, leukemia, HIV/AIDS, or any other immune system problem?   No   In the past 3 months, have you taken medications that affect  your immune system, such as prednisone, other steroids, or anticancer drugs; drugs for the treatment of rheumatoid arthritis, Crohn s disease, or psoriasis; or have you had radiation treatments?   No   Have you had a seizure, or a brain or other nervous system problem?   No   During the past year, have you received a transfusion of blood or blood     products, or been given immune (gamma) globulin or antiviral drug?   No   For women: Are you pregnant or is there a chance you could become        pregnant during the next month?   No   Have you received any vaccinations in the past 4 weeks?   No     Immunization questionnaire answers were all negative.      Patient instructed to remain in clinic for 15 minutes afterwards, and to report any adverse reaction to me immediately.    Prior to injection verified patient identity using patient's name and date of birth.  Vaccine information supplied for td.     Screening performed by Nicolette Vanegas on 4/4/2019 at 4:32 PM.

## 2019-04-04 NOTE — PROGRESS NOTES
"  SUBJECTIVE:   Tommy Best is a 39 year old male who presents to clinic today for the following health issues:      Medication Followup of methylphenidate    Taking Medication as prescribed: yes    Side Effects:  None    Medication Helping Symptoms:  yes     Hurt right leg last summer   Doing yoga   Cannot run   Never bruised   No swelling at the time     He had foot concerns also   He was wearing arch supports   Then he stopped the arch supports       Reviewed and updated as needed this visit by clinical staff  Tobacco  Allergies  Meds  Med Hx  Surg Hx  Fam Hx  Soc Hx      Reviewed and updated as needed this visit by Provider           Taking vitamin d now     Patient is going on his third child         O: /71 (BP Location: Left arm, Patient Position: Chair, Cuff Size: Adult Large)   Pulse 76   Temp 98.4  F (36.9  C) (Oral)   Ht 1.94 m (6' 4.38\")   Wt 85.2 kg (187 lb 12 oz)   SpO2 98%   BMI 22.63 kg/m      Head: Normocephalic, atraumatic.  Eyes: Conjunctiva clear, non icteric. PERRLA.  Ears: External ears and TMs normal BL.  Nose: Septum midline, nasal mucosa pink and moist. No discharge.  Mouth / Throat: Normal dentition.  No oral lesions. Pharynx non erythematous, tonsils without hypertrophy.  Neck: Supple, no enlarged LN, trachea midline.    Chest wall normal to inspection and palpation. Good excursion bilaterally. Lungs clear to auscultation. Good air movement bilaterally without rales, wheezes, or rhonchi.   Regular rate and  rhythm. S1 and S2 normal, no murmurs, clicks, gallops or rubs. No edema or JVD.    Patient is wearing KT tape on the medial side of his leg on the right   He has pain with adducting the leg after playing soccer,     No swelling   No bruising   No redness or warmth   Normal strength       ICD-10-CM    1. Need for tetanus booster Z23 VACCINE ADMINISTRATION, INITIAL   2. Attention deficit disorder (ADD) without hyperactivity F98.8 methylphenidate (METADATE CD) 30 MG CR " capsule     methylphenidate (METADATE CD) 30 MG CR capsule     methylphenidate (METADATE CD) 30 MG CR capsule     methylphenidate (METADATE CD) 20 MG CR capsule     methylphenidate (METADATE CD) 20 MG CR capsule     methylphenidate (METADATE CD) 20 MG CR capsule   3. Muscle strain of lower leg, right, initial encounter S86.911A      Recheck in 3 months   Call in 4 weeks if leg not better and I will refer to sports medicine

## 2019-04-19 ENCOUNTER — MYC MEDICAL ADVICE (OUTPATIENT)
Dept: FAMILY MEDICINE | Facility: CLINIC | Age: 39
End: 2019-04-19

## 2019-04-19 DIAGNOSIS — M79.651 PAIN OF RIGHT THIGH: Primary | ICD-10-CM

## 2019-04-22 NOTE — TELEPHONE ENCOUNTER
Routing to PCP to review and advise.    Please see My Chart message.      Pattie Lopez RN  [facility]

## 2019-04-22 NOTE — TELEPHONE ENCOUNTER
Patient has pain in the medial thigh.   Failed home exercise   I have referred to sports medicine

## 2019-05-07 ENCOUNTER — ANCILLARY PROCEDURE (OUTPATIENT)
Dept: GENERAL RADIOLOGY | Facility: CLINIC | Age: 39
End: 2019-05-07
Attending: FAMILY MEDICINE
Payer: COMMERCIAL

## 2019-05-07 ENCOUNTER — OFFICE VISIT (OUTPATIENT)
Dept: ORTHOPEDICS | Facility: CLINIC | Age: 39
End: 2019-05-07
Payer: COMMERCIAL

## 2019-05-07 VITALS
HEART RATE: 78 BPM | BODY MASS INDEX: 22.77 KG/M2 | HEIGHT: 76 IN | SYSTOLIC BLOOD PRESSURE: 126 MMHG | DIASTOLIC BLOOD PRESSURE: 72 MMHG | WEIGHT: 187 LBS

## 2019-05-07 DIAGNOSIS — M79.604 RIGHT LEG PAIN: Primary | ICD-10-CM

## 2019-05-07 DIAGNOSIS — M79.604 RIGHT LEG PAIN: ICD-10-CM

## 2019-05-07 PROCEDURE — 99214 OFFICE O/P EST MOD 30 MIN: CPT | Performed by: FAMILY MEDICINE

## 2019-05-07 PROCEDURE — 73552 X-RAY EXAM OF FEMUR 2/>: CPT | Mod: RT | Performed by: RADIOLOGY

## 2019-05-07 ASSESSMENT — MIFFLIN-ST. JEOR: SCORE: 1875.76

## 2019-05-07 NOTE — PROGRESS NOTES
"      [facility]  5/7/2019    Tommy Best's chief complaint for this visit includes:  Chief Complaint   Patient presents with     Consult     right medial thigh pain, close to knee, occured last fall, was better over the winter, started getting more active in the last couple of weeks, pain returned.      PCP: Moose Parson    Referring Provider:  Moose Parson MD  [address] [place] [address]    /72   Pulse 78   Ht 1.94 m (6' 4.38\")   Wt 84.8 kg (187 lb)   BMI 22.54 kg/m    Data Unavailable       Reason for visit:     What part of your body is injured / painful?  right thigh medial leg     What caused the injury /pain? No inciting event     How long ago did your injury occur or pain begin? several weeks ago    What are your most bothersome symptoms? Pain    How would you characterize your symptom?  aching    What makes your symptoms better? Rest    What makes your symptoms worse? Movement    Have you been previously seen for this problem? No    Medical History:    Any recent changes to your medical history? No    Any new medication prescribed since last visit? No    Have you had surgery on this body part before? No    Social History:    Occupation:      Handedness: Left      Review of Systems:    Do you have fever, chills, weight loss? No    Do you have any vision problems? No    Do you have any chest pain or edema? No    Do you have any shortness of breath or wheezing?  No    Do you have stomach problems? No    Do you have any numbness or focal weakness? No    Do you have diabetes? No    Do you have problems with bleeding or clotting? No    Do you have an rashes or other skin lesions? No       CHIEF COMPLAINT:  Consult (right medial thigh pain, close to knee, occured last fall, was better over the winter, started getting more active in the last couple of weeks, pain returned. )       HISTORY OF PRESENT ILLNESS  Mr. Best is a pleasant 39 " year old year old male who presents to clinic today with pain in his right leg.  He is seen at the request of Dr. Parson.    Tommy has been experiencing a pain in his right medial distal thigh region, close to his knee.  Pain started last fall, may be related to him starting running?  Close to the onset he was running faster than he normally does, although he does not recall a specific event.  He also had different shoe inserts at the time, he is concerned that this may have altered his gait.  He points to the medial portion of his thigh, near the knee but not at the knee joint.  Tender to the touch, worse in certain positions such as when he plays tennis and bends down to  a ball, also when he picks his son up from his crib.  Pain will extend down below the knee joint, wrapping anterior medially.  He denies any numbness deep, achy.      Additional history: as documented    MEDICAL HISTORY  Patient Active Problem List   Diagnosis     Attention deficit disorder     Photosensitivity     Hyperlipidemia LDL goal <160       Current Outpatient Medications   Medication Sig Dispense Refill     Cholecalciferol (VITAMIN D PO)        fluticasone (FLONASE) 50 MCG/ACT nasal spray SPRAY 2 SPRAYS INTO BOTH NOSTRILS DAILY (Patient not taking: Reported on 12/20/2018) 16 mL 1     methylphenidate (METADATE CD) 20 MG CR capsule Take 1 capsule (20 mg) by mouth daily 30 capsule 0     [START ON 6/5/2019] methylphenidate (METADATE CD) 20 MG CR capsule Take 1 capsule (20 mg) by mouth daily 30 capsule 0     methylphenidate (METADATE CD) 20 MG CR capsule Take 1 capsule (20 mg) by mouth daily 30 capsule 0     methylphenidate (METADATE CD) 20 MG CR capsule Take 1 capsule (20 mg) by mouth daily 30 capsule 0     methylphenidate (METADATE CD) 30 MG CR capsule Take 1 capsule (30 mg) by mouth daily 30 capsule 0     [START ON 6/5/2019] methylphenidate (METADATE CD) 30 MG CR capsule Take 1 capsule (30 mg) by mouth daily 30 capsule 0      "methylphenidate (METADATE CD) 30 MG CR capsule Take 1 capsule (30 mg) by mouth daily 30 capsule 0     methylphenidate (METADATE CD) 30 MG CR capsule Take 1 capsule (30 mg) by mouth daily 30 capsule 0     sildenafil (VIAGRA) 100 MG tablet Take 0.5-1 tablets ( mg) by mouth daily as needed Take 30 min to 4 hours before intercourse.  Never use with nitroglycerin, terazosin or doxazosin. 6 tablet 11       Allergies   Allergen Reactions     Seasonal Allergies        Family History   Problem Relation Age of Onset     Family History Negative Mother      Alcohol/Drug Father         alcholol     Diabetes Father         Type 2     Chemical Addiction Father         Alcoholic     Hypertension Father      Anxiety Disorder Father      Substance Abuse Father      Thyroid Disease Father      Obesity Father      Diabetes Maternal Grandfather         Type 1     Diabetes Paternal Grandmother         Type 2     Cancer - colorectal Paternal Grandfather         Age 60     Hypertension Paternal Grandfather      Colon Cancer Paternal Grandfather        Additional medical/Social/Surgical histories reviewed in [facility] and updated as appropriate.          PHYSICAL EXAM  Vitals:    05/07/19 0801   BP: 126/72   Pulse: 78   Weight: 84.8 kg (187 lb)   Height: 1.94 m (6' 4.38\")     General  - normal appearance, in no obvious distress  CV  - normal popliteal pulse  Pulm  - normal respiratory pattern, non-labored  Musculoskeletal - right thigh, knee  - stance: normal gait without limp  - inspection: no swelling or effusion, normal bone and joint alignment, no obvious deformity  - palpation: Tender along distal semimembranosus and gracilis, also tender at their tendinous insertion  - ROM: 135 degrees flexion, -5 degrees extension, not painful, normal actively and passively compared to contralateral  - strength: 5/5 in flexion, 5/5 in extension  - special tests:  (-) Lachman  (-) Moreno  (-) varus at 0 and 30 degrees flexion  (-) valgus at 0 and " 30 degrees flexion    Neuro  - no sensory or motor deficit, grossly normal coordination, normal muscle tone  Skin  - no ecchymosis, erythema, warmth, or induration, no obvious rash  Psych  - interactive, appropriate, normal mood and affect                 ASSESSMENT & PLAN  Mr. Best is a 39 year old year old male who presents to clinic today with pain in his right medial thigh and knee region.    I ordered and reviewed an x-ray of his right femur which shows no acute issues.    Tommy's symptoms are consistent with a musculotendinous injury of the medial hamstring and abductor musculature.  It is difficult to tell but I do suspect that his gait may have played a role in his injury, this is supported by the timing of his injury with new shoe inserts.    Regardless I do think he will do well with physical therapy, I am referring him for gait restructuring and focused muscular retraining.    If his symptoms do not start to improve for 6 weeks after starting therapy I would likely order an MRI.    Thank you for allowing me to participate in Tommy's care.    Pranav Martinez DO, CAQSM  [facility] Sports [facility]

## 2019-05-07 NOTE — LETTER
"    5/7/2019         RE: Tommy Best  [address] [place], [place] [id number]        Dear Colleague,    Thank you for referring your patient, Tommy Best, to the [facility]. Please see a copy of my visit note below.          [facility]  5/7/2019    Tommy Best's chief complaint for this visit includes:  Chief Complaint   Patient presents with     Consult     right medial thigh pain, close to knee, occured last fall, was better over the winter, started getting more active in the last couple of weeks, pain returned.      PCP: Moose Parson    Referring Provider:  Moose Parson MD  [address] [place] [address]    /72   Pulse 78   Ht 1.94 m (6' 4.38\")   Wt 84.8 kg (187 lb)   BMI 22.54 kg/m     Data Unavailable       Reason for visit:     What part of your body is injured / painful?  right thigh medial leg     What caused the injury /pain? No inciting event     How long ago did your injury occur or pain begin? several weeks ago    What are your most bothersome symptoms? Pain    How would you characterize your symptom?  aching    What makes your symptoms better? Rest    What makes your symptoms worse? Movement    Have you been previously seen for this problem? No    Medical History:    Any recent changes to your medical history? No    Any new medication prescribed since last visit? No    Have you had surgery on this body part before? No    Social History:    Occupation:      Handedness: Left      Review of Systems:    Do you have fever, chills, weight loss? No    Do you have any vision problems? No    Do you have any chest pain or edema? No    Do you have any shortness of breath or wheezing?  No    Do you have stomach problems? No    Do you have any numbness or focal weakness? No    Do you have diabetes? No    Do you have problems with bleeding or clotting? No    Do you have an rashes or other skin lesions? No   "     CHIEF COMPLAINT:  Consult (right medial thigh pain, close to knee, occured last fall, was better over the winter, started getting more active in the last couple of weeks, pain returned. )       HISTORY OF PRESENT ILLNESS  Mr. Best is a pleasant 39 year old year old male who presents to clinic today with pain in his right leg.  He is seen at the request of Dr. Parson.    Tommy has been experiencing a pain in his right medial distal thigh region, close to his knee.  Pain started last fall, may be related to him starting running?  Close to the onset he was running faster than he normally does, although he does not recall a specific event.  He also had different shoe inserts at the time, he is concerned that this may have altered his gait.  He points to the medial portion of his thigh, near the knee but not at the knee joint.  Tender to the touch, worse in certain positions such as when he plays tennis and bends down to  a ball, also when he picks his son up from his crib.  Pain will extend down below the knee joint, wrapping anterior medially.  He denies any numbness deep, achy.      Additional history: as documented    MEDICAL HISTORY  Patient Active Problem List   Diagnosis     Attention deficit disorder     Photosensitivity     Hyperlipidemia LDL goal <160       Current Outpatient Medications   Medication Sig Dispense Refill     Cholecalciferol (VITAMIN D PO)        fluticasone (FLONASE) 50 MCG/ACT nasal spray SPRAY 2 SPRAYS INTO BOTH NOSTRILS DAILY (Patient not taking: Reported on 12/20/2018) 16 mL 1     methylphenidate (METADATE CD) 20 MG CR capsule Take 1 capsule (20 mg) by mouth daily 30 capsule 0     [START ON 6/5/2019] methylphenidate (METADATE CD) 20 MG CR capsule Take 1 capsule (20 mg) by mouth daily 30 capsule 0     methylphenidate (METADATE CD) 20 MG CR capsule Take 1 capsule (20 mg) by mouth daily 30 capsule 0     methylphenidate (METADATE CD) 20 MG CR capsule Take 1 capsule (20 mg) by  "mouth daily 30 capsule 0     methylphenidate (METADATE CD) 30 MG CR capsule Take 1 capsule (30 mg) by mouth daily 30 capsule 0     [START ON 6/5/2019] methylphenidate (METADATE CD) 30 MG CR capsule Take 1 capsule (30 mg) by mouth daily 30 capsule 0     methylphenidate (METADATE CD) 30 MG CR capsule Take 1 capsule (30 mg) by mouth daily 30 capsule 0     methylphenidate (METADATE CD) 30 MG CR capsule Take 1 capsule (30 mg) by mouth daily 30 capsule 0     sildenafil (VIAGRA) 100 MG tablet Take 0.5-1 tablets ( mg) by mouth daily as needed Take 30 min to 4 hours before intercourse.  Never use with nitroglycerin, terazosin or doxazosin. 6 tablet 11       Allergies   Allergen Reactions     Seasonal Allergies        Family History   Problem Relation Age of Onset     Family History Negative Mother      Alcohol/Drug Father         alcholol     Diabetes Father         Type 2     Chemical Addiction Father         Alcoholic     Hypertension Father      Anxiety Disorder Father      Substance Abuse Father      Thyroid Disease Father      Obesity Father      Diabetes Maternal Grandfather         Type 1     Diabetes Paternal Grandmother         Type 2     Cancer - colorectal Paternal Grandfather         Age 60     Hypertension Paternal Grandfather      Colon Cancer Paternal Grandfather        Additional medical/Social/Surgical histories reviewed in [facility] and updated as appropriate.          PHYSICAL EXAM  Vitals:    05/07/19 0801   BP: 126/72   Pulse: 78   Weight: 84.8 kg (187 lb)   Height: 1.94 m (6' 4.38\")     General  - normal appearance, in no obvious distress  CV  - normal popliteal pulse  Pulm  - normal respiratory pattern, non-labored  Musculoskeletal - right thigh, knee  - stance: normal gait without limp  - inspection: no swelling or effusion, normal bone and joint alignment, no obvious deformity  - palpation: Tender along distal semimembranosus and gracilis, also tender at their tendinous insertion  - ROM: 135 degrees " flexion, -5 degrees extension, not painful, normal actively and passively compared to contralateral  - strength: 5/5 in flexion, 5/5 in extension  - special tests:  (-) Lachman  (-) Moreno  (-) varus at 0 and 30 degrees flexion  (-) valgus at 0 and 30 degrees flexion    Neuro  - no sensory or motor deficit, grossly normal coordination, normal muscle tone  Skin  - no ecchymosis, erythema, warmth, or induration, no obvious rash  Psych  - interactive, appropriate, normal mood and affect                 ASSESSMENT & PLAN  Mr. Best is a 39 year old year old male who presents to clinic today with pain in his right medial thigh and knee region.    I ordered and reviewed an x-ray of his right femur which shows no acute issues.    Tommy's symptoms are consistent with a musculotendinous injury of the medial hamstring and abductor musculature.  It is difficult to tell but I do suspect that his gait may have played a role in his injury, this is supported by the timing of his injury with new shoe inserts.    Regardless I do think he will do well with physical therapy, I am referring him for gait restructuring and focused muscular retraining.    If his symptoms do not start to improve for 6 weeks after starting therapy I would likely order an MRI.    Thank you for allowing me to participate in Tommy's care.    Pranav Martinez DO, [facility]  Primary Care Sports Medicine           Again, thank you for allowing me to participate in the care of your patient.        Sincerely,        Pranav Martinez DO

## 2019-05-15 ENCOUNTER — THERAPY VISIT (OUTPATIENT)
Dept: PHYSICAL THERAPY | Facility: CLINIC | Age: 39
End: 2019-05-15
Attending: FAMILY MEDICINE
Payer: COMMERCIAL

## 2019-05-15 DIAGNOSIS — M79.661 PAIN OF RIGHT LOWER LEG: ICD-10-CM

## 2019-05-15 DIAGNOSIS — M79.604 RIGHT LEG PAIN: ICD-10-CM

## 2019-05-15 PROCEDURE — 97161 PT EVAL LOW COMPLEX 20 MIN: CPT | Mod: GP | Performed by: PHYSICAL THERAPIST

## 2019-05-15 PROCEDURE — 97112 NEUROMUSCULAR REEDUCATION: CPT | Mod: GP | Performed by: PHYSICAL THERAPIST

## 2019-05-15 NOTE — PROGRESS NOTES
[facility] Initial Evaluation  Subjective:  The history is provided by the patient. No  was used.   Tommy Best is a 39 year old male with a right hip (September 2018) condition.    Condition occurred: for unknown reasons.  This is a chronic condition     Site of Pain: right lower inner thigh.  Radiates to: radiates to right pes anserine.  Pain is described as aching and is intermittent and reported as 3/10.  Associated with: none. Pain is worse in the P.M..  Exacerbated by: walking. and relieved by NSAID's (wrapping with ace bandage, ice).  Since onset symptoms are gradually worsening.  Special tests:  X-ray (unremarkable).  Previous treatment: none.  Improvement with previous treatment: none.  General health as reported by patient is good.                                              Objective:        Flexibility/Screens:   Negative screens: Lumbar                                                      Hip Evaluation  HIP AROM:    Flexion: Left: 105    Right:  105    Extension: Left: 10    Right:  0  Abduction: Left:  Wnl    Right:  Wnl    Adduction: Left: wnl     Right: wnl  Internal Rotation: Left: 15    Right: 10  External Rotation: Left: 34    Right: 30  Knee Flexion:  Left: wnl    Right: WNL  Knee Extension: Left: wnl    Right: WNL    Hip Strength:    Flexion:   Left: 5/5   Pain:  Right: 5-/5   Pain:                    Extension:  Left: 5-/5  Pain:Right: 4+/5    Pain:    Abduction:  Left: 3+/5     Pain:Right: 3+/5    Pain:  Adduction:  Left: 3/5    Pain:Right: 3/5   +  Pain:  Internal Rotation:  Left: 5/5    Pain:Right: 5/5   Pain:  External Rotation:  Left: 5/5   Pain:    Knee Flexion:  Left: 5/5   Pain:Right: 4+/5   Pain:  Knee Extension:  Left: 5/5   Pain:Right: 5/5    Pain:        Hip Special Testing:   Not Assessed        Hip Palpation:  Palpations normal left hip: right distal hip adductors.             forward bend test positive right, right inferior pubic  symphysis, decreased  Hip mobility in non capsular pattern (B),     General     ROS    Assessment/Plan:    Patient is a 39 year old male with right side hip complaints.    Patient has the following significant findings with corresponding treatment plan.                Diagnosis 1:   Right leg pain   Pain -  hot/cold therapy, manual therapy, self management, education, directional preference exercise and home program  Decreased ROM/flexibility - manual therapy, therapeutic exercise, therapeutic activity and home program  Decreased joint mobility - manual therapy, therapeutic exercise, therapeutic activity and home program  Decreased strength - therapeutic exercise, therapeutic activities and home program  Impaired muscle performance - neuro re-education and home program  Decreased function - therapeutic activities and home program  Instability -  Therapeutic Activity  Therapeutic Exercise  Neuromuscular Re-education  home program    Therapy Evaluation Codes:   1) Decision-Making    Low complexity using standardized patient assessment instrument and/or measureable assessment of functional outcome.  Cumulative Therapy Evaluation is: Low complexity.    Previous and current functional limitations:  (See Goal Flow Sheet for this information)    Short term and Long term goals: (See Goal Flow Sheet for this information)     Communication ability:  Patient appears to be able to clearly communicate and understand verbal and written communication and follow directions correctly.  Treatment Explanation - The following has been discussed with the patient:   RX ordered/plan of care  Anticipated outcomes  Possible risks and side effects  This patient would benefit from PT intervention to resume normal activities.   Rehab potential is good.    Frequency:  1 X week, once daily  Duration:  for 6 weeks  Discharge Plan:  Achieve all LTG.  Independent in home treatment program.  Reach maximal therapeutic benefit.    Please refer to the  daily flowsheet for treatment today, total treatment time and time spent performing 1:1 timed codes.

## 2019-05-16 PROBLEM — M79.661 PAIN OF RIGHT LOWER LEG: Status: ACTIVE | Noted: 2019-05-16

## 2019-05-17 NOTE — PROGRESS NOTES
[facility] Initial Evaluation  Subjective:    General   General health as reported by patient:  Good  Please check all that apply to your current or past medical history:  Other (Attention Deficit)  Medications you are currently taking:  Pain medication and other (Methylphenidate)  Occupation comment:    What are your primary job tasks:  Prolonged sitting (Computer Work)                      Objective:  System    Physical Exam    General     ROS    Assessment/Plan:

## 2019-05-29 ENCOUNTER — THERAPY VISIT (OUTPATIENT)
Dept: PHYSICAL THERAPY | Facility: CLINIC | Age: 39
End: 2019-05-29
Payer: COMMERCIAL

## 2019-05-29 DIAGNOSIS — M79.661 PAIN OF RIGHT LOWER LEG: ICD-10-CM

## 2019-05-29 PROCEDURE — 97140 MANUAL THERAPY 1/> REGIONS: CPT | Mod: GP | Performed by: PHYSICAL THERAPIST

## 2019-05-29 PROCEDURE — 97112 NEUROMUSCULAR REEDUCATION: CPT | Mod: GP | Performed by: PHYSICAL THERAPIST

## 2019-05-29 PROCEDURE — 97110 THERAPEUTIC EXERCISES: CPT | Mod: GP | Performed by: PHYSICAL THERAPIST

## 2019-06-05 ENCOUNTER — THERAPY VISIT (OUTPATIENT)
Dept: PHYSICAL THERAPY | Facility: CLINIC | Age: 39
End: 2019-06-05
Payer: COMMERCIAL

## 2019-06-05 DIAGNOSIS — M79.661 PAIN OF RIGHT LOWER LEG: ICD-10-CM

## 2019-06-05 PROCEDURE — 97110 THERAPEUTIC EXERCISES: CPT | Mod: GP | Performed by: PHYSICAL THERAPIST

## 2019-06-05 PROCEDURE — 97140 MANUAL THERAPY 1/> REGIONS: CPT | Mod: GP | Performed by: PHYSICAL THERAPIST

## 2019-06-05 PROCEDURE — 97112 NEUROMUSCULAR REEDUCATION: CPT | Mod: GP | Performed by: PHYSICAL THERAPIST

## 2019-06-10 DIAGNOSIS — M79.605 LEG PAIN, LEFT: Primary | ICD-10-CM

## 2019-06-13 ENCOUNTER — OFFICE VISIT (OUTPATIENT)
Dept: FAMILY MEDICINE | Facility: CLINIC | Age: 39
End: 2019-06-13
Payer: COMMERCIAL

## 2019-06-13 VITALS
BODY MASS INDEX: 23.62 KG/M2 | HEART RATE: 73 BPM | DIASTOLIC BLOOD PRESSURE: 80 MMHG | OXYGEN SATURATION: 99 % | TEMPERATURE: 97.3 F | SYSTOLIC BLOOD PRESSURE: 120 MMHG | WEIGHT: 196 LBS

## 2019-06-13 DIAGNOSIS — M79.604 PAIN IN BOTH LOWER EXTREMITIES: Primary | ICD-10-CM

## 2019-06-13 DIAGNOSIS — F98.8 ATTENTION DEFICIT DISORDER (ADD) WITHOUT HYPERACTIVITY: ICD-10-CM

## 2019-06-13 DIAGNOSIS — M79.605 PAIN IN BOTH LOWER EXTREMITIES: Primary | ICD-10-CM

## 2019-06-13 PROCEDURE — 99213 OFFICE O/P EST LOW 20 MIN: CPT | Performed by: FAMILY MEDICINE

## 2019-06-13 RX ORDER — METHYLPHENIDATE HYDROCHLORIDE 30 MG/1
30 CAPSULE, EXTENDED RELEASE ORAL EVERY MORNING
Qty: 30 CAPSULE | Refills: 0 | Status: SHIPPED | OUTPATIENT
Start: 2019-06-13 | End: 2019-08-27

## 2019-06-13 RX ORDER — METHYLPHENIDATE HYDROCHLORIDE 20 MG/1
20 CAPSULE, EXTENDED RELEASE ORAL DAILY
Qty: 30 CAPSULE | Refills: 0 | Status: SHIPPED | OUTPATIENT
Start: 2019-06-13 | End: 2019-08-27

## 2019-06-13 RX ORDER — METHYLPHENIDATE HYDROCHLORIDE 20 MG/1
20 CAPSULE, EXTENDED RELEASE ORAL DAILY
Qty: 30 CAPSULE | Refills: 0 | Status: SHIPPED | OUTPATIENT
Start: 2019-07-13 | End: 2019-08-27

## 2019-06-13 RX ORDER — METHYLPHENIDATE HYDROCHLORIDE 20 MG/1
20 CAPSULE, EXTENDED RELEASE ORAL DAILY
Qty: 30 CAPSULE | Refills: 0 | Status: SHIPPED | OUTPATIENT
Start: 2019-08-13 | End: 2019-08-27

## 2019-06-13 RX ORDER — METHYLPHENIDATE HYDROCHLORIDE 30 MG/1
30 CAPSULE, EXTENDED RELEASE ORAL DAILY
Qty: 30 CAPSULE | Refills: 0 | Status: SHIPPED | OUTPATIENT
Start: 2019-07-13 | End: 2019-08-27

## 2019-06-13 RX ORDER — METHYLPHENIDATE HYDROCHLORIDE 30 MG/1
30 CAPSULE, EXTENDED RELEASE ORAL DAILY
Qty: 30 CAPSULE | Refills: 0 | Status: SHIPPED | OUTPATIENT
Start: 2019-08-13 | End: 2019-08-27

## 2019-06-13 NOTE — LETTER
[facility]  06/13/19    Patient: Tommy Best  YOB: 1980  Medical Record Number: [id number]  CSN: [id number]                                                                              Non-opioid Controlled Substance Agreement    I understand that my care provider has prescribed a controlled substance to help manage my condition(s). I am taking this medicine to help me function or work. I know this is strong medicine, and that it can cause serious side effects. Controlled substances can be sedating, addicting and may cause a dependency on the drug. They can affect my ability to drive or think, and cause depression. They need to be taken exactly as prescribed. Combining controlled substances with certain medicines or chemicals (such as cocaine, sedatives and tranquilizers, sleeping pills, meth) can be dangerous or even fatal. Also, if I stop controlled substances suddenly, I may have severe withdrawal symptoms.  If not helpful, I may be asked to stop them.    The risks, benefits, and side effects of these medicine(s) were explained to me. I agree that:    1. I will take part in other treatments as advised by my care team. This may be psychiatry or counseling, physical therapy, behavioral therapy, group treatment or a referral to a pain clinic. I will reduce or stop my medicine when my care team tells me to do so.  2. I will take my medicines as prescribed. I will not change the dose or schedule unless my care team tells me to. There will be no refills if I  run out early.   I may be contactedwithout warning and asked to complete a urine drug test or pill count at any time.   3. I will keep all my appointments, and understand this is part of the monitoring of controlled substances. My care team may require an office visit for EVERY controlled substance refill. If I miss appointments or don t follow instructions, my care team may stop my medicine.  4. I will not ask other  providers to prescribe controlled substances, and I will not accept controlled substances from other people. If I need another prescribed controlled substance for a new reason, I will tell my care team within 1 business day.  5. I will use one pharmacy to fill all of my controlled substance prescriptions, and it is up to me to make sure that I do not run out of my medicines on weekends or holidays. If my care team is willing to refill my controlled substance prescription without a visit, I must request refills only during office hours, refills may take up to 3 days to process, and it may take up to 5 to 7 days for my medicine to be mailed and ready at my pharmacy. Prescriptions will not be mailed anywhere except my pharmacy.    6. I am responsible for my prescriptions. If the medicine/prescription is lost or stolen, it will not be replaced. I also agree not to share controlled substance medicines with anyone.              [facility]  06/13/19  Patient:  Tommy Best  YOB: 1980  Medical Record Number: [id number]  CSN: [id number]    7. I agree to not use ANY illegal or recreational drugs. This includes marijuana, cocaine, bath salts or other drugs. I agree not to use alcohol unless my care team says I may. I agree to give urine samples whenever asked. If I don t give a urine sample, the care team may stop my medicine.    8. If I enroll in the [place] Medical Marijuana program, I will tell my care team. I will also sign an agreement to share my medical records with my care team.    9. I will bring in my list of medicines (or my medicine bottles) each time I come to the clinic.   10. I will tell my care team right away if I become pregnant or have a new medical problem treated outside of my regular clinic.  11. I understand that this medicine can affect my thinking and judgment. It may be unsafe for me to drive, use machinery and do dangerous tasks. I will not do any of these things  until I know how the medicine affects me. If my dose changes, I will wait to see how it affects me. I will contact my care team if I have concerns about medicine side effects.    I understand that if I do not follow any of the conditions above, my prescriptions or treatment may be stopped.      I agree that my provider, clinic care team, and pharmacy may work with any city, state or federal law enforcement agency that investigates the misuse, sale, or other diversion of my controlled medicine. I will allow my provider to discuss my care with or share a copy of this agreement with any other treating provider, pharmacy or emergency room where I receive care. I agree to give up (waive) any right of privacy or confidentiality with respect to these consents.   I have read this agreement and have asked questions about anything I did not understand.    ____________________________________________________    ____________  ________  Patient signature                                                         Date      Time    ____________________________________________________     ____________  ________  Witness                                                          Date      Time    ____________________________________________________  Provider signature

## 2019-06-13 NOTE — NURSING NOTE
3 prescription's each of Methylphenidate 20mg & 30mg was given to patient at office visit  Date:  6/13/19  Signature:  Ivonne Estrada MA

## 2019-06-13 NOTE — PROGRESS NOTES
Subjective     Tommy Best is a 39 year old male who presents to clinic today for the following health issues:    HPI     Medication Followup of Methylphenidate    Taking Medication as prescribed: yes    Side Effects:  None    Medication Helping Symptoms:  yes     Follow up leg concerns  Now he complains of the left leg hurts also   This seemed to start with trying to run   He thinks it was related to Orthotics       SUBJECTIVE:   Tommy eBst is a 39 year old male who presents to clinic today for the following health issues:      Medication Followup of methylphenidate    Taking Medication as prescribed: yes    Side Effects:  None    Medication Helping Symptoms:  yes     Hurt right leg last summer   Unable to do his yoga    Cannot run   Never bruised   No swelling at the time     He had foot concerns also   He was wearing arch supports   Then he stopped the arch supports       Reviewed and updated as needed this visit by clinical staff  Tobacco  Allergies  Meds  Med Hx  Surg Hx  Fam Hx  Soc Hx      Reviewed and updated as needed this visit by Provider           Taking vitamin d now     Patient is going on his third child         O: /80 (BP Location: Right arm, Patient Position: Sitting, Cuff Size: Adult Large)   Pulse 73   Temp 97.3  F (36.3  C) (Oral)   Wt 88.9 kg (196 lb)   SpO2 99%   BMI 23.62 kg/m      Head: Normocephalic, atraumatic.  Eyes: Conjunctiva clear, non icteric. PERRLA.  Ears: External ears and TMs normal BL.  Nose: Septum midline, nasal mucosa pink and moist. No discharge.  Mouth / Throat: Normal dentition.  No oral lesions. Pharynx non erythematous, tonsils without hypertrophy.  Neck: Supple, no enlarged LN, trachea midline.    Chest wall normal to inspection and palpation. Good excursion bilaterally. Lungs clear to auscultation. Good air movement bilaterally without rales, wheezes, or rhonchi.   Regular rate and  rhythm. S1 and S2 normal, no murmurs, clicks, gallops or rubs.  No edema or JVD.     He has pain with adducting the leg after playing soccer,     No swelling   No bruising   No redness or warmth   Normal strength       ICD-10-CM    1. Pain in both lower extremities M79.604     M79.605    2. Attention deficit disorder (ADD) without hyperactivity F98.8 methylphenidate (METADATE CD) 20 MG CR capsule     methylphenidate (METADATE CD) 30 MG CR capsule     methylphenidate (METADATE CD) 20 MG CR capsule     methylphenidate (METADATE CD) 20 MG CR capsule     methylphenidate (METADATE CD) 30 MG CR capsule     methylphenidate (METADATE CD) 30 MG CR capsule     Recheck in 3 months   Call in 4 weeks if leg not better and I have  Referred r to sports medicine

## 2019-06-17 ENCOUNTER — OFFICE VISIT (OUTPATIENT)
Dept: ORTHOPEDICS | Facility: CLINIC | Age: 39
End: 2019-06-17
Payer: COMMERCIAL

## 2019-06-17 VITALS — HEART RATE: 61 BPM | SYSTOLIC BLOOD PRESSURE: 127 MMHG | DIASTOLIC BLOOD PRESSURE: 82 MMHG | OXYGEN SATURATION: 99 %

## 2019-06-17 DIAGNOSIS — M79.605 LEFT LEG PAIN: ICD-10-CM

## 2019-06-17 DIAGNOSIS — M79.604 RIGHT LEG PAIN: Primary | ICD-10-CM

## 2019-06-17 PROCEDURE — 99213 OFFICE O/P EST LOW 20 MIN: CPT | Performed by: FAMILY MEDICINE

## 2019-06-17 ASSESSMENT — PAIN SCALES - GENERAL: PAINLEVEL: MILD PAIN (2)

## 2019-06-17 NOTE — PROGRESS NOTES
HISTORY OF PRESENT ILLNESS  Mr. Best is a pleasant 39 year old year old male following up with leg pain.  Tommy initially saw me about 6 weeks ago for right medial leg pain that has developed over time.  I referred him to physical therapy for a possible hamstring and adductor issue, as well as gait restructuring.  Unfortunately he does not feel that he is any better.  He may feel worse, if anything.  He continues to have a medial right sided thigh pain, about 8 to 10 cm proximal to his knee.  He has developed nearly identical pain in his left medial leg.  He continues to do his physical therapy exercises, he has also purchased some stabilization running shoes.  Pain continues to be present mostly when he is at heel strike in his walk or toe off on his contralateral leg.  Of note, he denies any numbness or tingling, no weakness.     PHYSICAL EXAM  Vitals:    06/17/19 0751   BP: 127/82   BP Location: Left arm   Patient Position: Sitting   Cuff Size: Adult Large   Pulse: 61   SpO2: 99%     General  - normal appearance, in no obvious distress  CV  - normal popliteal pulse  Pulm  - normal respiratory pattern, non-labored  Musculoskeletal - right thigh, knee  - stance: normal gait without limp  - inspection: no swelling or effusion, normal bone and joint alignment, no obvious deformity  - palpation: Tender along semimembranosus and gracilis, less tender at their tendinous insertion  - ROM: 135 degrees flexion, -5 degrees extension, not painful, normal actively and passively compared to contralateral  Musculoskeletal - left thigh, knee  - stance: normal gait without limp  - inspection: no swelling or effusion, normal bone and joint alignment, no obvious deformity  - palpation: Tender along semimembranosus and gracilis, less tender at their tendinous insertion  - ROM: 135 degrees flexion, -5 degrees extension, not painful, normal actively and passively compared to contralateral      ASSESSMENT & PLAN  Mr. Best is a 39  year old year old male following up with right leg pain.  He also has new onset of identical pain in his left leg.    After some discussion I do think an MRI would be reasonable, he can get this done at his earliest convenience.  I will get in touch with him with his results.    It was a pleasure seeing Tommy.        Pranav Martinez DO, [facility]

## 2019-06-17 NOTE — LETTER
6/17/2019         RE: Tommy Best  [address] [place] [address]        Dear Colleague,    Thank you for referring your patient, Tommy Best, to the [facility]. Please see a copy of my visit note below.          [facility] FOLLOW-UP VISIT 6/17/2019    Tommy Best's chief complaint for this visit includes:  Chief Complaint   Patient presents with     Follow Up     right leg pain - now having same pain in left medial thigh     PCP: Moose Parson    Referring Provider:  Moose Parson MD  [address] [place] [address]    There were no vitals taken for this visit.  Data Unavailable       Interval History:     Follow up reason: right leg pain  - experiencing same pain in left medial thigh    Following Therapeutic Plan: Yes     Pain: Unchanged with exception of left thigh pain    Function: Worsening    Medical History:    Any recent changes to your medical history? No    Any new medication prescribed since last visit? No    Review of Systems:    Do you have fever, chills, weight loss? No    Do you have any vision problems? No    Do you have any chest pain or edema? No    Do you have any shortness of breath or wheezing?  No    Do you have stomach problems? No    Do you have any numbness or focal weakness? No    Do you have diabetes? No    Do you have problems with bleeding or clotting? No    Do you have an rashes or other skin lesions? No      HISTORY OF PRESENT ILLNESS  Mr. Best is a pleasant 39 year old year old male following up with leg pain.  Tommy initially saw me about 6 weeks ago for right medial leg pain that has developed over time.  I referred him to physical therapy for a possible hamstring and adductor issue, as well as gait restructuring.  Unfortunately he does not feel that he is any better.  He may feel worse, if anything.  He continues to have a medial right sided thigh pain, about 8 to 10 cm proximal to  his knee.  He has developed nearly identical pain in his left medial leg.  He continues to do his physical therapy exercises, he has also purchased some stabilization running shoes.  Pain continues to be present mostly when he is at heel strike in his walk or toe off on his contralateral leg.  Of note, he denies any numbness or tingling, no weakness.     PHYSICAL EXAM  Vitals:    06/17/19 0751   BP: 127/82   BP Location: Left arm   Patient Position: Sitting   Cuff Size: Adult Large   Pulse: 61   SpO2: 99%     General  - normal appearance, in no obvious distress  CV  - normal popliteal pulse  Pulm  - normal respiratory pattern, non-labored  Musculoskeletal - right thigh, knee  - stance: normal gait without limp  - inspection: no swelling or effusion, normal bone and joint alignment, no obvious deformity  - palpation: Tender along semimembranosus and gracilis,  less tender at their tendinous insertion  - ROM: 135 degrees flexion, -5 degrees extension, not painful, normal actively and passively compared to contralateral  Musculoskeletal -  left thigh, knee  - stance: normal gait without limp  - inspection: no swelling or effusion, normal bone and joint alignment, no obvious deformity  - palpation: Tender along semimembranosus and gracilis,  less tender at their tendinous insertion  - ROM: 135 degrees flexion, -5 degrees extension, not painful, normal actively and passively compared to contralateral      ASSESSMENT & PLAN  Mr. Best is a 39 year old year old male following up with right leg pain.  He also has new onset of identical pain in his left leg.    After some discussion I do think an MRI would be reasonable, he can get this done at his earliest convenience.  I will get in touch with him with his results.    It was a pleasure seeing Amilcar Martinez DO, [facility]            Again, thank you for allowing me to participate in the care of your patient.        Sincerely,        Pranav Martinez DO

## 2019-06-17 NOTE — PATIENT INSTRUCTIONS
Thanks for coming today.  Ortho[facility] Medicine Clinic  [address] [place] [address]    To schedule future appointments in Ortho Clinic, you may call [phone].    To schedule ordered imaging by your provider:   Call [facility] [phone]    To schedule an injection ordered by your provider:  Call [facility] Injection scheduling line: [phone]  UserApphart available online at:  [email or web address]    Please call if any further questions or concerns ([phone]).  Clinic hours 8 am to 5 pm.    Return to clinic (call) if symptoms worsen or fail to improve.

## 2019-06-17 NOTE — PROGRESS NOTES
[facility] FOLLOW-UP VISIT 6/17/2019    Tommy Best's chief complaint for this visit includes:  Chief Complaint   Patient presents with     Follow Up     right leg pain - now having same pain in left medial thigh     PCP: Moose Parson    Referring Provider:  Moose Parson MD  [address] [place] [address]    There were no vitals taken for this visit.  Data Unavailable       Interval History:     Follow up reason: right leg pain  - experiencing same pain in left medial thigh    Following Therapeutic Plan: Yes     Pain: Unchanged with exception of left thigh pain    Function: Worsening    Medical History:    Any recent changes to your medical history? No    Any new medication prescribed since last visit? No    Review of Systems:    Do you have fever, chills, weight loss? No    Do you have any vision problems? No    Do you have any chest pain or edema? No    Do you have any shortness of breath or wheezing?  No    Do you have stomach problems? No    Do you have any numbness or focal weakness? No    Do you have diabetes? No    Do you have problems with bleeding or clotting? No    Do you have an rashes or other skin lesions? No

## 2019-06-20 ENCOUNTER — THERAPY VISIT (OUTPATIENT)
Dept: PHYSICAL THERAPY | Facility: CLINIC | Age: 39
End: 2019-06-20
Payer: COMMERCIAL

## 2019-06-20 DIAGNOSIS — M79.661 PAIN OF RIGHT LOWER LEG: ICD-10-CM

## 2019-06-20 PROCEDURE — 97112 NEUROMUSCULAR REEDUCATION: CPT | Mod: GP | Performed by: PHYSICAL THERAPIST

## 2019-06-20 PROCEDURE — 97140 MANUAL THERAPY 1/> REGIONS: CPT | Mod: GP | Performed by: PHYSICAL THERAPIST

## 2019-06-22 ENCOUNTER — ANCILLARY PROCEDURE (OUTPATIENT)
Dept: MRI IMAGING | Facility: CLINIC | Age: 39
End: 2019-06-22
Attending: FAMILY MEDICINE
Payer: COMMERCIAL

## 2019-06-22 DIAGNOSIS — M79.604 RIGHT LEG PAIN: ICD-10-CM

## 2019-06-22 DIAGNOSIS — M79.605 LEFT LEG PAIN: ICD-10-CM

## 2019-06-22 PROCEDURE — 73718 MRI LOWER EXTREMITY W/O DYE: CPT | Mod: RT | Performed by: RADIOLOGY

## 2019-06-22 PROCEDURE — 73718 MRI LOWER EXTREMITY W/O DYE: CPT | Mod: LT | Performed by: RADIOLOGY

## 2019-06-25 ENCOUNTER — THERAPY VISIT (OUTPATIENT)
Dept: PHYSICAL THERAPY | Facility: CLINIC | Age: 39
End: 2019-06-25
Payer: COMMERCIAL

## 2019-06-25 DIAGNOSIS — M79.661 PAIN OF RIGHT LOWER LEG: ICD-10-CM

## 2019-06-25 PROCEDURE — 97112 NEUROMUSCULAR REEDUCATION: CPT | Mod: GP | Performed by: PHYSICAL THERAPIST

## 2019-06-25 PROCEDURE — 97140 MANUAL THERAPY 1/> REGIONS: CPT | Mod: GP | Performed by: PHYSICAL THERAPIST

## 2019-06-27 ENCOUNTER — THERAPY VISIT (OUTPATIENT)
Dept: PHYSICAL THERAPY | Facility: CLINIC | Age: 39
End: 2019-06-27
Payer: COMMERCIAL

## 2019-06-27 DIAGNOSIS — M79.661 PAIN OF RIGHT LOWER LEG: ICD-10-CM

## 2019-06-27 PROCEDURE — 97110 THERAPEUTIC EXERCISES: CPT | Mod: GP | Performed by: PHYSICAL THERAPIST

## 2019-06-27 PROCEDURE — 97140 MANUAL THERAPY 1/> REGIONS: CPT | Mod: GP | Performed by: PHYSICAL THERAPIST

## 2019-07-08 ENCOUNTER — TELEPHONE (OUTPATIENT)
Dept: ORTHOPEDICS | Facility: CLINIC | Age: 39
End: 2019-07-08

## 2019-07-08 NOTE — TELEPHONE ENCOUNTER
[facility] Call Center    Phone Message    May a detailed message be left on voicemail: yes    Reason for Call: Other: Pt is requesting a call back from care team to discuss if he needs a follow up appt to his MRI. He requested to schedule in case, but will cancel if it's not necessary. Please advise.     Action Taken: Message routed to:  Adult Clinics: Sports Medicine p [phone]

## 2019-07-09 NOTE — TELEPHONE ENCOUNTER
The patient was contacted today to review his MRI results. The patient MRI was normal. The patient is agreeable. Dr. Martinez is recommending further evaluation of the patients lumbar spine, if he is still having symptoms, or he would recommend trying gait training. The patient reports doing better since starting gaiting training and strengthening  with PT. The patient will follow up for further testing if  His symptoms return or worsen.

## 2019-07-16 ENCOUNTER — THERAPY VISIT (OUTPATIENT)
Dept: PHYSICAL THERAPY | Facility: CLINIC | Age: 39
End: 2019-07-16
Payer: COMMERCIAL

## 2019-07-16 DIAGNOSIS — M79.661 PAIN OF RIGHT LOWER LEG: ICD-10-CM

## 2019-07-16 PROCEDURE — 97140 MANUAL THERAPY 1/> REGIONS: CPT | Mod: GP | Performed by: PHYSICAL THERAPIST

## 2019-07-16 PROCEDURE — 97112 NEUROMUSCULAR REEDUCATION: CPT | Mod: GP | Performed by: PHYSICAL THERAPIST

## 2019-08-01 ENCOUNTER — THERAPY VISIT (OUTPATIENT)
Dept: PHYSICAL THERAPY | Facility: CLINIC | Age: 39
End: 2019-08-01
Payer: COMMERCIAL

## 2019-08-01 DIAGNOSIS — M79.661 PAIN OF RIGHT LOWER LEG: ICD-10-CM

## 2019-08-01 PROCEDURE — 97112 NEUROMUSCULAR REEDUCATION: CPT | Mod: GP | Performed by: PHYSICAL THERAPIST

## 2019-08-01 PROCEDURE — 97140 MANUAL THERAPY 1/> REGIONS: CPT | Mod: GP | Performed by: PHYSICAL THERAPIST

## 2019-08-13 ENCOUNTER — THERAPY VISIT (OUTPATIENT)
Dept: PHYSICAL THERAPY | Facility: CLINIC | Age: 39
End: 2019-08-13
Payer: COMMERCIAL

## 2019-08-13 DIAGNOSIS — M79.661 PAIN OF RIGHT LOWER LEG: ICD-10-CM

## 2019-08-13 PROCEDURE — 97140 MANUAL THERAPY 1/> REGIONS: CPT | Mod: GP | Performed by: PHYSICAL THERAPIST

## 2019-08-13 PROCEDURE — 97112 NEUROMUSCULAR REEDUCATION: CPT | Mod: GP | Performed by: PHYSICAL THERAPIST

## 2019-08-19 DIAGNOSIS — N52.8 OTHER MALE ERECTILE DYSFUNCTION: ICD-10-CM

## 2019-08-19 NOTE — TELEPHONE ENCOUNTER
"Requested Prescriptions   Pending Prescriptions Disp Refills     sildenafil (VIAGRA) 100 MG tablet [Pharmacy Med Name: SILDENAFIL 100 MG TABLET] 6 tablet 11     Sig: TAKE 1/2 TO 1 TAB BY MOUTH EVERY DAY AS NEEDED.TAKE 30MINS TO 4 HOURS BEFORE INTERCOURSE   Last Written Prescription Date:  6/14/18  Last Fill Quantity: 6,  # refills: 11   Last office visit: 6/13/2019 with prescribing provider:     Future Office Visit:   Next 5 appointments (look out 90 days)    Aug 21, 2019  9:40 AM CDT  Return Visit with Pranav Martinez DO  [facility] ([facility]) [id number] [place] [id number] [phone]             Erectile Dysfuction Protocol Passed - 8/19/2019  9:35 AM        Passed - Absence of nitrates on medication list        Passed - Absence of Alpha Blockers on Med list        Passed - Recent (12 mo) or future (30 days) visit within the authorizing provider's specialty     Patient had office visit in the last 12 months or has a visit in the next 30 days with authorizing provider or within the authorizing provider's specialty.  See \"Patient Info\" tab in inbasket, or \"Choose Columns\" in Meds & Orders section of the refill encounter.              Passed - Medication is active on med list        Passed - Patient is age 18 or older          "

## 2019-08-20 RX ORDER — SILDENAFIL 100 MG/1
TABLET, FILM COATED ORAL
Qty: 6 TABLET | Refills: 8 | Status: SHIPPED | OUTPATIENT
Start: 2019-08-20

## 2019-08-21 ENCOUNTER — OFFICE VISIT (OUTPATIENT)
Dept: ORTHOPEDICS | Facility: CLINIC | Age: 39
End: 2019-08-21
Payer: COMMERCIAL

## 2019-08-21 VITALS — BODY MASS INDEX: 23.87 KG/M2 | HEIGHT: 76 IN | WEIGHT: 196 LBS

## 2019-08-21 DIAGNOSIS — M79.604 BILATERAL LEG PAIN: Primary | ICD-10-CM

## 2019-08-21 DIAGNOSIS — M79.605 BILATERAL LEG PAIN: Primary | ICD-10-CM

## 2019-08-21 PROCEDURE — 99213 OFFICE O/P EST LOW 20 MIN: CPT | Performed by: FAMILY MEDICINE

## 2019-08-21 ASSESSMENT — PAIN SCALES - GENERAL: PAINLEVEL: MILD PAIN (3)

## 2019-08-21 ASSESSMENT — MIFFLIN-ST. JEOR: SCORE: 1916.58

## 2019-08-21 NOTE — PROGRESS NOTES
"HISTORY OF PRESENT ILLNESS  Mr. Bset is a pleasant 39 year old year old male following up with now chronic bilateral leg pain.  Tommy has unfortunately gotten no relief since last seeing me.  He continues to have bilateral leg pain throughout the abductor region down to his knees.  No change in his symptoms.  He is here to review his MRIs.     PHYSICAL EXAM  Vitals:    08/21/19 0943   Weight: 88.9 kg (196 lb)   Height: 1.94 m (6' 4.38\")       ASSESSMENT & PLAN  Mr. Best is a 39 year old year old male following up with chronic bilateral leg pain.    I reviewed his MR imaging in the room with him which essentially shows no obvious anatomical cause for his symptoms.    We had a long discussion centering around diagnosis moving forward.  Given his ongoing symptoms I do think it is reasonable to explore other, less common causes for his pain.  I am ordering EMG imaging of both lower extremities.  I will get in touch with him with the results.    It was a pleasure seeing Tommy.    **Unfortunately his EMG is scheduled for more than a month from now, I am ordering ankle-brachial indices and an MRI of his lumbar spine**    20 minutes was spent in the room, 15 of which was spent on counseling and coordination of care.        Pranav Martinez DO, CAQSM      This note was constructed using Dragon dictation software, please excuse any minor errors in spelling, grammar, or syntax.        [facility] FOLLOW-UP VISIT 8/21/2019    Tommy Best's chief complaint for this visit includes:  Chief Complaint   Patient presents with     RECHECK     bilateral leg pain, continuing with PT, having set backs everyone week.      PCP: Moose Parson    Referring Provider:  Moose Parson MD  [address] [place] [address]    Ht 1.94 m (6' 4.38\")   Wt 88.9 kg (196 lb)   BMI 23.62 kg/m    Mild Pain (3)       Interval History:     Follow up reason: right and left pain    Following Therapeutic " Plan: Yes     Pain: Unchanged    Function: Unchanged      Medical History:    Any recent changes to your medical history? No    Any new medication prescribed since last visit? No    Review of Systems:    Do you have fever, chills, weight loss? No    Do you have any vision problems? No    Do you have any chest pain or edema? No    Do you have any shortness of breath or wheezing?  No    Do you have stomach problems? No    Do you have any numbness or focal weakness? No    Do you have diabetes? No    Do you have problems with bleeding or clotting? No    Do you have an rashes or other skin lesions? No

## 2019-08-21 NOTE — LETTER
"    8/21/2019         RE: Tommy Best  [address] [place] [address]        Dear Colleague,    Thank you for referring your patient, Tommy Best, to the [facility]. Please see a copy of my visit note below.    HISTORY OF PRESENT ILLNESS  Mr. Best is a pleasant 39 year old year old male following up with now chronic bilateral leg pain.  Tommy has unfortunately gotten no relief since last seeing me.  He continues to have bilateral leg pain throughout the abductor region down to his knees.  No change in his symptoms.  He is here to review his MRIs.     PHYSICAL EXAM  Vitals:    08/21/19 0943   Weight: 88.9 kg (196 lb)   Height: 1.94 m (6' 4.38\")       ASSESSMENT & PLAN  Mr. Best is a 39 year old year old male following up with chronic bilateral leg pain.    I reviewed his MR imaging in the room with him which essentially shows no obvious anatomical cause for his symptoms.    We had a long discussion centering around diagnosis moving forward.  Given his ongoing symptoms I do think it is reasonable to explore other, less common causes for his pain.  I am ordering EMG imaging of both lower extremities.  I will get in touch with him with the results.    It was a pleasure seeing Tommy.    **Unfortunately his EMG is scheduled for more than a month from now, I am ordering ankle-brachial indices and an MRI of his lumbar spine**    20 minutes was spent in the room, 15 of which was spent on counseling and coordination of care.        Pranav Martinez DO, CAQSM      This note was constructed using Dragon dictation software, please excuse any minor errors in spelling, grammar, or syntax.        [facility] FOLLOW-UP VISIT 8/21/2019    Tommy Best's chief complaint for this visit includes:  Chief Complaint   Patient presents with     RECHECK     bilateral leg pain, continuing with PT, having set backs everyone week.      PCP: Moose Parson    Referring " "Provider:  Moose Parson MD  [address] [place] [address]    Ht 1.94 m (6' 4.38\")   Wt 88.9 kg (196 lb)   BMI 23.62 kg/m     Mild Pain (3)       Interval History:     Follow up reason: right and left pain    Following Therapeutic Plan: Yes     Pain: Unchanged    Function: Unchanged      Medical History:    Any recent changes to your medical history? No    Any new medication prescribed since last visit? No    Review of Systems:    Do you have fever, chills, weight loss? No    Do you have any vision problems? No    Do you have any chest pain or edema? No    Do you have any shortness of breath or wheezing?  No    Do you have stomach problems? No    Do you have any numbness or focal weakness? No    Do you have diabetes? No    Do you have problems with bleeding or clotting? No    Do you have an rashes or other skin lesions? No      Again, thank you for allowing me to participate in the care of your patient.        Sincerely,        Pranav Martinez DO    "

## 2019-08-22 DIAGNOSIS — M79.605 BILATERAL LEG PAIN: Primary | ICD-10-CM

## 2019-08-22 DIAGNOSIS — M79.604 BILATERAL LEG PAIN: Primary | ICD-10-CM

## 2019-08-26 ENCOUNTER — TELEPHONE (OUTPATIENT)
Dept: ORTHOPEDICS | Facility: CLINIC | Age: 39
End: 2019-08-26

## 2019-08-26 ENCOUNTER — TRANSFERRED RECORDS (OUTPATIENT)
Dept: HEALTH INFORMATION MANAGEMENT | Facility: CLINIC | Age: 39
End: 2019-08-26

## 2019-08-26 NOTE — TELEPHONE ENCOUNTER
Health Call Center    Phone Message    May a detailed message be left on voicemail: yes    Reason for Call: Other: Pt states he has an EMG today at 2:20 at [facility]. He states he is having some leg weakness, Juan asked him before the appt and pt orginally said no, but now yes.    Pt states he is very concerned if the testing needs to be modified.     Please advise   Action Taken: Message routed to:  Adult Clinics: Sports Medicine p [phone]

## 2019-08-26 NOTE — TELEPHONE ENCOUNTER
The patient was contacted today, per Dr. Martinez no mortification need to be made to the EMG order, but the patient should inform the neurologist of his new symptoms. It was also informed to the patient that he should sign a JOE so we receive the results.

## 2019-08-27 ENCOUNTER — OFFICE VISIT (OUTPATIENT)
Dept: FAMILY MEDICINE | Facility: CLINIC | Age: 39
End: 2019-08-27
Payer: COMMERCIAL

## 2019-08-27 VITALS
SYSTOLIC BLOOD PRESSURE: 111 MMHG | BODY MASS INDEX: 23.5 KG/M2 | DIASTOLIC BLOOD PRESSURE: 71 MMHG | TEMPERATURE: 97.3 F | HEART RATE: 81 BPM | WEIGHT: 195 LBS

## 2019-08-27 DIAGNOSIS — F41.1 GAD (GENERALIZED ANXIETY DISORDER): Primary | ICD-10-CM

## 2019-08-27 DIAGNOSIS — F98.8 ATTENTION DEFICIT DISORDER (ADD) WITHOUT HYPERACTIVITY: ICD-10-CM

## 2019-08-27 PROCEDURE — 99214 OFFICE O/P EST MOD 30 MIN: CPT | Performed by: FAMILY MEDICINE

## 2019-08-27 RX ORDER — METHYLPHENIDATE HYDROCHLORIDE 20 MG/1
20 CAPSULE, EXTENDED RELEASE ORAL DAILY
Qty: 30 CAPSULE | Refills: 0 | Status: SHIPPED | OUTPATIENT
Start: 2019-09-13 | End: 2022-12-06

## 2019-08-27 RX ORDER — METHYLPHENIDATE HYDROCHLORIDE 20 MG/1
20 CAPSULE, EXTENDED RELEASE ORAL DAILY
Qty: 30 CAPSULE | Refills: 0 | Status: SHIPPED | OUTPATIENT
Start: 2019-10-13 | End: 2022-12-06

## 2019-08-27 RX ORDER — METHYLPHENIDATE HYDROCHLORIDE 30 MG/1
30 CAPSULE, EXTENDED RELEASE ORAL DAILY
Qty: 30 CAPSULE | Refills: 0 | Status: SHIPPED | OUTPATIENT
Start: 2019-11-13 | End: 2022-12-06

## 2019-08-27 RX ORDER — METHYLPHENIDATE HYDROCHLORIDE 20 MG/1
20 CAPSULE, EXTENDED RELEASE ORAL DAILY
Qty: 30 CAPSULE | Refills: 0 | Status: SHIPPED | OUTPATIENT
Start: 2019-11-13 | End: 2022-12-06

## 2019-08-27 RX ORDER — METHYLPHENIDATE HYDROCHLORIDE 30 MG/1
30 CAPSULE, EXTENDED RELEASE ORAL DAILY
Qty: 30 CAPSULE | Refills: 0 | Status: SHIPPED | OUTPATIENT
Start: 2019-11-27 | End: 2019-08-27

## 2019-08-27 RX ORDER — METHYLPHENIDATE HYDROCHLORIDE 30 MG/1
30 CAPSULE, EXTENDED RELEASE ORAL EVERY MORNING
Qty: 30 CAPSULE | Refills: 0 | Status: SHIPPED | OUTPATIENT
Start: 2019-09-13 | End: 2022-12-06

## 2019-08-27 RX ORDER — CLORAZEPATE DIPOTASSIUM 7.5 MG/1
7.5 TABLET ORAL 2 TIMES DAILY
Qty: 21 TABLET | Refills: 0 | Status: SHIPPED | OUTPATIENT
Start: 2019-08-27 | End: 2022-12-06

## 2019-08-27 RX ORDER — METHYLPHENIDATE HYDROCHLORIDE 30 MG/1
30 CAPSULE, EXTENDED RELEASE ORAL DAILY
Qty: 30 CAPSULE | Refills: 0 | Status: SHIPPED | OUTPATIENT
Start: 2019-10-13 | End: 2022-12-06

## 2019-08-27 ASSESSMENT — ANXIETY QUESTIONNAIRES
IF YOU CHECKED OFF ANY PROBLEMS ON THIS QUESTIONNAIRE, HOW DIFFICULT HAVE THESE PROBLEMS MADE IT FOR YOU TO DO YOUR WORK, TAKE CARE OF THINGS AT HOME, OR GET ALONG WITH OTHER PEOPLE: VERY DIFFICULT
3. WORRYING TOO MUCH ABOUT DIFFERENT THINGS: SEVERAL DAYS
6. BECOMING EASILY ANNOYED OR IRRITABLE: NOT AT ALL
1. FEELING NERVOUS, ANXIOUS, OR ON EDGE: NEARLY EVERY DAY
5. BEING SO RESTLESS THAT IT IS HARD TO SIT STILL: NOT AT ALL
7. FEELING AFRAID AS IF SOMETHING AWFUL MIGHT HAPPEN: NEARLY EVERY DAY
GAD7 TOTAL SCORE: 11
2. NOT BEING ABLE TO STOP OR CONTROL WORRYING: NEARLY EVERY DAY

## 2019-08-27 ASSESSMENT — PATIENT HEALTH QUESTIONNAIRE - PHQ9: 5. POOR APPETITE OR OVEREATING: SEVERAL DAYS

## 2019-08-27 NOTE — PROGRESS NOTES
Subjective     Tommy Best is a 39 year old male who presents to clinic today for the following health issues:    HPI     Anxiety    Follow up on Bilat leg  Patient has been seeing a sports medicine doctor.  He is undergone a work-up including MRI of the brain MRI of the soft tissue of the lower legs and the back    He has had one small area in the brain that was nondescript, but the differential did include MS.  Patient was somewhat concerned about that.  He was also concerned about ALS and he was reassured that he did not have that by the sports medicine doctor did the order.  It is unclear to me whether he has seen a neurologist but it sounds like he did.  Sounds like he was at [facility] and they did a EMG there and that EMG was negative according to the patient.    Patient's leg pain seems to be improved.  He was holding back because he was afraid he would get recurrent muscle tears but being that his MRI of his leg was normal he feels like he can increase his activity level.    He has been having intermittent periods of increased anxiety where he gets almost a sudden panic attack he wakes up in the morning he feels like he has a pit in his stomach and then that gradually resolves.    Ros: no chest pain, chest tightness   No sob   No leg edema   No abdominal pain     Denies fever or chills   Weight stable    Denies muscle twitches       Add meds working with no change   Takes 2 x a day dosing with high dose in am and lower dose in pm       O: /71 (BP Location: Left arm, Patient Position: Sitting, Cuff Size: Adult Large)   Pulse 81   Temp 97.3  F (36.3  C) (Oral)   Wt 88.5 kg (195 lb)   BMI 23.50 kg/m      Gait improved   Able to get out of chair without difficulty   No upper body symptoms     Chest wall normal to inspection and palpation. Good excursion bilaterally. Lungs clear to auscultation. Good air movement bilaterally without rales, wheezes, or rhonchi.   Regular rate and   rhythm. S1 and S2 normal, no murmurs, clicks, gallops or rubs. No edema or JVD.    slr are normal bilaterally     Medication Followup of Methylphenidate    Taking Medication as prescribed: NO    Side Effects:  None    Medication Helping Symptoms:  yes         ICD-10-CM    1. EVERETTE (generalized anxiety disorder) F41.1 clorazepate (TRANXENE) 7.5 MG tablet   2. Attention deficit disorder (ADD) without hyperactivity F98.8 methylphenidate (METADATE CD) 20 MG CR capsule     methylphenidate (METADATE CD) 20 MG CR capsule     methylphenidate (METADATE CD) 20 MG CR capsule     methylphenidate (METADATE CD) 30 MG CR capsule     methylphenidate (METADATE CD) 30 MG CR capsule     methylphenidate (METADATE CD) 30 MG CR capsule     DISCONTINUED: methylphenidate (METADATE CD) 30 MG CR capsule     Recheck in 3 months     Discussed follow up with psychology to lear relaxation techniques

## 2019-08-28 ENCOUNTER — ANCILLARY PROCEDURE (OUTPATIENT)
Dept: MRI IMAGING | Facility: CLINIC | Age: 39
End: 2019-08-28
Attending: FAMILY MEDICINE
Payer: COMMERCIAL

## 2019-08-28 ENCOUNTER — ANCILLARY PROCEDURE (OUTPATIENT)
Dept: ULTRASOUND IMAGING | Facility: CLINIC | Age: 39
End: 2019-08-28
Attending: FAMILY MEDICINE
Payer: COMMERCIAL

## 2019-08-28 DIAGNOSIS — M79.604 BILATERAL LEG PAIN: ICD-10-CM

## 2019-08-28 DIAGNOSIS — M79.605 BILATERAL LEG PAIN: ICD-10-CM

## 2019-08-28 PROCEDURE — 72148 MRI LUMBAR SPINE W/O DYE: CPT | Performed by: RADIOLOGY

## 2019-08-28 ASSESSMENT — ANXIETY QUESTIONNAIRES: GAD7 TOTAL SCORE: 11

## 2019-10-04 ENCOUNTER — TRANSFERRED RECORDS (OUTPATIENT)
Dept: HEALTH INFORMATION MANAGEMENT | Facility: CLINIC | Age: 39
End: 2019-10-04

## 2019-10-07 ENCOUNTER — TRANSFERRED RECORDS (OUTPATIENT)
Dept: HEALTH INFORMATION MANAGEMENT | Facility: CLINIC | Age: 39
End: 2019-10-07

## 2019-10-17 ENCOUNTER — TRANSFERRED RECORDS (OUTPATIENT)
Dept: HEALTH INFORMATION MANAGEMENT | Facility: CLINIC | Age: 39
End: 2019-10-17

## 2019-10-25 ENCOUNTER — TELEPHONE (OUTPATIENT)
Dept: FAMILY MEDICINE | Facility: CLINIC | Age: 39
End: 2019-10-25

## 2019-10-25 NOTE — TELEPHONE ENCOUNTER
See Mychart request from this patient.  He requested notes be made available for his rheumatologist.    He needs these by Tuesday. Z

## 2019-10-28 NOTE — TELEPHONE ENCOUNTER
Per [facility] Request:     Tommy Best would like to request a referral.   Reason: Chronic Pain   Requested provider: Dr. Pranav Pagan   Comment:   Dr. Pagan is a Rheumatologist who practices out of [place] ([facility]  I am scheduled to meet with him at 9am this coming Tuesday, October 22nd.  He has requested the following be faxed to [phone] prior to visiting with me.     1. Last two visit notes   2. Last physical   3. Most recent blood work     Please let me know if these cannot be provided.  I can stop by to pick them up if faxing is a problem.  My phone number is  if you need to talk to me.  As an FYI, I looked for Rheumatologists in the [facility] network, but they were booked until February.     Thanks,   Tommy Best

## 2019-11-08 ENCOUNTER — HEALTH MAINTENANCE LETTER (OUTPATIENT)
Age: 39
End: 2019-11-08

## 2019-11-08 ENCOUNTER — MYC MEDICAL ADVICE (OUTPATIENT)
Dept: FAMILY MEDICINE | Facility: CLINIC | Age: 39
End: 2019-11-08

## 2019-11-08 DIAGNOSIS — E78.5 HYPERLIPIDEMIA LDL GOAL <160: Primary | ICD-10-CM

## 2019-11-08 NOTE — TELEPHONE ENCOUNTER
I have put in an order for fasting cholesterols.  You can have them done anytime including the day you come in if you come in fasting.  Do not eat anything for 12 hours.  Drinking fluids that contain no calories or sugar or creamer is okay.

## 2019-11-08 NOTE — TELEPHONE ENCOUNTER
Information below sent to patient per my chart.  Julieta Elaine, RN,BSN  [facility]

## 2019-11-15 ENCOUNTER — OFFICE VISIT (OUTPATIENT)
Dept: FAMILY MEDICINE | Facility: CLINIC | Age: 39
End: 2019-11-15
Payer: COMMERCIAL

## 2019-11-15 VITALS
TEMPERATURE: 97.8 F | BODY MASS INDEX: 20.97 KG/M2 | DIASTOLIC BLOOD PRESSURE: 72 MMHG | WEIGHT: 174 LBS | OXYGEN SATURATION: 98 % | HEART RATE: 79 BPM | SYSTOLIC BLOOD PRESSURE: 112 MMHG

## 2019-11-15 DIAGNOSIS — E78.5 HYPERLIPIDEMIA LDL GOAL <160: Primary | ICD-10-CM

## 2019-11-15 DIAGNOSIS — F90.8 ATTENTION DEFICIT HYPERACTIVITY DISORDER (ADHD), OTHER TYPE: ICD-10-CM

## 2019-11-15 DIAGNOSIS — Z83.3 FAMILY HISTORY OF DIABETES MELLITUS: ICD-10-CM

## 2019-11-15 LAB
CHOLEST SERPL-MCNC: 187 MG/DL
GLUCOSE BLD-MCNC: 93 MG/DL (ref 70–99)
HDLC SERPL-MCNC: 45 MG/DL
LDLC SERPL CALC-MCNC: 127 MG/DL
NONHDLC SERPL-MCNC: 142 MG/DL
TRIGL SERPL-MCNC: 74 MG/DL

## 2019-11-15 PROCEDURE — 82947 ASSAY GLUCOSE BLOOD QUANT: CPT | Performed by: FAMILY MEDICINE

## 2019-11-15 PROCEDURE — 36415 COLL VENOUS BLD VENIPUNCTURE: CPT | Performed by: FAMILY MEDICINE

## 2019-11-15 PROCEDURE — 80061 LIPID PANEL: CPT | Performed by: FAMILY MEDICINE

## 2019-11-15 PROCEDURE — 99214 OFFICE O/P EST MOD 30 MIN: CPT | Performed by: FAMILY MEDICINE

## 2019-11-15 RX ORDER — PREGABALIN 25 MG/1
25 CAPSULE ORAL 3 TIMES DAILY
COMMUNITY
Start: 2019-11-12 | End: 2022-12-06

## 2019-11-15 RX ORDER — METHYLPHENIDATE HYDROCHLORIDE 30 MG/1
30 CAPSULE, EXTENDED RELEASE ORAL DAILY
Qty: 30 CAPSULE | Refills: 0 | Status: SHIPPED | OUTPATIENT
Start: 2019-12-16 | End: 2020-01-31

## 2019-11-15 RX ORDER — METHYLPHENIDATE HYDROCHLORIDE 30 MG/1
30 CAPSULE, EXTENDED RELEASE ORAL DAILY
Qty: 30 CAPSULE | Refills: 0 | Status: SHIPPED | OUTPATIENT
Start: 2019-11-15 | End: 2020-01-31

## 2019-11-15 RX ORDER — GABAPENTIN 300 MG/1
300 CAPSULE ORAL 3 TIMES DAILY
COMMUNITY
Start: 2019-10-15 | End: 2022-12-06

## 2019-11-15 RX ORDER — METHYLPHENIDATE HYDROCHLORIDE 20 MG/1
20 CAPSULE, EXTENDED RELEASE ORAL DAILY
Qty: 30 CAPSULE | Refills: 0 | Status: SHIPPED | OUTPATIENT
Start: 2020-01-16 | End: 2020-02-15

## 2019-11-15 RX ORDER — METHYLPHENIDATE HYDROCHLORIDE 20 MG/1
20 CAPSULE, EXTENDED RELEASE ORAL DAILY
Qty: 30 CAPSULE | Refills: 0 | Status: SHIPPED | OUTPATIENT
Start: 2019-12-16 | End: 2020-01-31

## 2019-11-15 RX ORDER — METHYLPHENIDATE HYDROCHLORIDE 30 MG/1
30 CAPSULE, EXTENDED RELEASE ORAL DAILY
Qty: 30 CAPSULE | Refills: 0 | Status: SHIPPED | OUTPATIENT
Start: 2020-01-16 | End: 2020-02-15

## 2019-11-15 RX ORDER — METHYLPHENIDATE HYDROCHLORIDE 20 MG/1
20 CAPSULE, EXTENDED RELEASE ORAL DAILY
Qty: 30 CAPSULE | Refills: 0 | Status: SHIPPED | OUTPATIENT
Start: 2019-11-15 | End: 2020-01-31

## 2019-11-15 RX ORDER — DULOXETIN HYDROCHLORIDE 20 MG/1
20 CAPSULE, DELAYED RELEASE ORAL DAILY
COMMUNITY
Start: 2019-11-11 | End: 2022-12-06

## 2019-11-15 ASSESSMENT — PAIN SCALES - GENERAL: PAINLEVEL: NO PAIN (0)

## 2019-11-15 NOTE — PROGRESS NOTES
Subjective     Tommy Best is a 39 year old male who presents to clinic today for the following health issues:    HPI   Medication Followup of Methylphenidate    Taking Medication as prescribed: yes    Side Effects:  None    Medication Helping Symptoms:  yes       Ros: no chest pain, chest tightness   No sob   No leg edema   No abdominal pain     Denies fever or chills   Weight stable       He is dieting   He is trying to figure out his pain problems   He went to [facility]   Saw a rheumatologist   Recommended Glynn Chi     Current Outpatient Medications   Medication Sig Dispense Refill     Cholecalciferol (VITAMIN D PO)        gabapentin (NEURONTIN) 300 MG capsule Take 300 mg by mouth 3 times daily       methylphenidate (METADATE CD) 20 MG CR capsule Take 1 capsule (20 mg) by mouth daily 30 capsule 0     methylphenidate (METADATE CD) 20 MG CR capsule Take 1 capsule (20 mg) by mouth daily 30 capsule 0     methylphenidate (METADATE CD) 20 MG CR capsule Take 1 capsule (20 mg) by mouth daily 30 capsule 0     methylphenidate (METADATE CD) 30 MG CR capsule Take 1 capsule (30 mg) by mouth daily 30 capsule 0     methylphenidate (METADATE CD) 30 MG CR capsule Take 1 capsule (30 mg) by mouth every morning 30 capsule 0     methylphenidate (METADATE CD) 30 MG CR capsule Take 1 capsule (30 mg) by mouth daily 30 capsule 0     sildenafil (VIAGRA) 100 MG tablet TAKE 1/2 TO 1 TAB BY MOUTH EVERY DAY AS NEEDED.TAKE 30MINS TO 4 HOURS BEFORE INTERCOURSE 6 tablet 8     clorazepate (TRANXENE) 7.5 MG tablet Take 1 tablet (7.5 mg) by mouth 2 times daily (Patient not taking: Reported on 11/15/2019) 21 tablet 0     DULoxetine (CYMBALTA) 20 MG capsule Take 20 mg by mouth daily       fluticasone (FLONASE) 50 MCG/ACT nasal spray SPRAY 2 SPRAYS INTO BOTH NOSTRILS DAILY (Patient not taking: Reported on 12/20/2018) 16 mL 1     pregabalin (LYRICA) 25 MG capsule Take 25 mg by mouth 3 times daily         Patient feels better on the lower doses of cymbalta    Not needing clorazepate as much     Complains of inner thigh     Reviewed and updated as needed this visit by Provider       Gabapentin makes him not hungry         Objective    /72 (BP Location: Left arm, Patient Position: Chair, Cuff Size: Adult Regular)   Pulse 79   Temp 97.8  F (36.6  C) (Oral)   Wt 78.9 kg (174 lb)   SpO2 98%   BMI 20.97 kg/m    Body mass index is 20.97 kg/m .  Physical Exam       Wt Readings from Last 4 Encounters:   11/15/19 78.9 kg (174 lb)   08/27/19 88.5 kg (195 lb)   08/21/19 88.9 kg (196 lb)   06/13/19 88.9 kg (196 lb)     Chest wall normal to inspection and palpation. Good excursion bilaterally. Lungs clear to auscultation. Good air movement bilaterally without rales, wheezes, or rhonchi.   Regular rate and  rhythm. S1 and S2 normal, no murmurs, clicks, gallops or rubs. No edema or JVD.    Gait has really improved                 Here are the results:  Lab Results   Component Value Date    CHOL 207 08/05/2016     Lab Results   Component Value Date    HDL 49 08/05/2016     Lab Results   Component Value Date     08/05/2016     Lab Results   Component Value Date    TRIG 88 08/05/2016     Lab Results   Component Value Date    CHOLHDLRATIO 5.3 10/07/2011                                                                                                                                       ICD-10-CM    1. Hyperlipidemia LDL goal <160 E78.5 Lipid panel reflex to direct LDL Fasting   2. Family history of diabetes mellitus Z83.3 Glucose whole blood   3. Attention deficit hyperactivity disorder (ADHD), other type F90.8 methylphenidate (METADATE CD) 30 MG CR capsule     methylphenidate (METADATE CD) 30 MG CR capsule     methylphenidate (METADATE CD) 30 MG CR capsule     methylphenidate (METADATE CD) 20 MG CR capsule     methylphenidate (METADATE CD) 20 MG CR capsule     methylphenidate (METADATE CD) 20 MG CR capsule

## 2019-11-15 NOTE — LETTER
[facility]   [address] [place]  [address]  [phone]                                   November 19, 2019    Tommy Best  [address] [facility] [address]        Dear Tommy,    Your blood glucose is normal.  Total cholesterol is normal LDL cholesterol is improved it is in the normal range for somebody your age.  The lower the better.  I would repeat these labs in 1 year.  Continue to monitor red meats and dairy products to keep your cholesterol is lower.  Avoid fried foods and high fat foods.    Results for orders placed or performed in visit on 11/15/19   Glucose whole blood     Status: None   Result Value Ref Range    Glucose Whole Blood 93 70 - 99 mg/dL   Lipid panel reflex to direct LDL Fasting     Status: Abnormal   Result Value Ref Range    Cholesterol 187 <200 mg/dL    Triglycerides 74 <150 mg/dL    HDL Cholesterol 45 >39 mg/dL    LDL Cholesterol Calculated 127 (H) <100 mg/dL    Non HDL Cholesterol 142 (H) <130 mg/dL       If you have any questions please call the clinic at [phone]    Sincerely,    Moose Parson MD  bmd

## 2019-11-19 NOTE — RESULT ENCOUNTER NOTE
Your blood glucose is normal.  Total cholesterol is normal LDL cholesterol is improved it is in the normal range for somebody your age.  The lower the better.  I would repeat these labs in 1 year.  Continue to monitor red meats and dairy products to keep your cholesterol is lower.  Avoid fried foods and high fat foods.

## 2020-01-31 ENCOUNTER — OFFICE VISIT (OUTPATIENT)
Dept: FAMILY MEDICINE | Facility: CLINIC | Age: 40
End: 2020-01-31
Payer: COMMERCIAL

## 2020-01-31 VITALS
HEART RATE: 72 BPM | TEMPERATURE: 98 F | BODY MASS INDEX: 20.46 KG/M2 | SYSTOLIC BLOOD PRESSURE: 114 MMHG | DIASTOLIC BLOOD PRESSURE: 74 MMHG | HEIGHT: 76 IN | WEIGHT: 168 LBS

## 2020-01-31 DIAGNOSIS — J03.90 TONSILLITIS: Primary | ICD-10-CM

## 2020-01-31 DIAGNOSIS — R07.0 THROAT PAIN: ICD-10-CM

## 2020-01-31 LAB
DEPRECATED S PYO AG THROAT QL EIA: NORMAL
SPECIMEN SOURCE: NORMAL

## 2020-01-31 PROCEDURE — 99213 OFFICE O/P EST LOW 20 MIN: CPT | Performed by: NURSE PRACTITIONER

## 2020-01-31 PROCEDURE — 87880 STREP A ASSAY W/OPTIC: CPT | Performed by: NURSE PRACTITIONER

## 2020-01-31 PROCEDURE — 87081 CULTURE SCREEN ONLY: CPT | Performed by: NURSE PRACTITIONER

## 2020-01-31 ASSESSMENT — MIFFLIN-ST. JEOR: SCORE: 1784.57

## 2020-01-31 NOTE — PROGRESS NOTES
Subjective     Tommy Best is a 40 year old male who presents to clinic today for the following health issues:    HPI   ENT Symptoms             Symptoms: cc Present Absent Comment   Fever/Chills   x    Fatigue   x    Muscle Aches   x    Eye Irritation   x    Sneezing   x    Nasal Alexander/Drg   x    Sinus Pressure/Pain   x    Loss of smell   x    Dental pain   x    Sore Throat  x  Bothersome around melba time    Swollen Glands  x     Ear Pain/Fullness   x    Cough   x    Wheeze   x    Chest Pain   xx    Shortness of breath   x    Rash   x    Other   x      Symptom duration:  one month    Symptom severity:  mild   Treatments tried:  none   Contacts:  yes, son diagnosed with strep          Patient Active Problem List   Diagnosis     Attention deficit disorder     Photosensitivity     Hyperlipidemia LDL goal <160     Pain of right lower leg     Past Surgical History:   Procedure Laterality Date     NO HISTORY OF SURGERY         Social History     Tobacco Use     Smoking status: Never Smoker     Smokeless tobacco: Never Used   Substance Use Topics     Alcohol use: Yes     Comment: occasional      Family History   Problem Relation Age of Onset     Family History Negative Mother      Alcohol/Drug Father         alcholol     Diabetes Father         Type 2     Chemical Addiction Father         Alcoholic     Hypertension Father      Anxiety Disorder Father      Substance Abuse Father      Thyroid Disease Father      Obesity Father      Diabetes Maternal Grandfather         Type 1     Diabetes Paternal Grandmother         Type 2     Cancer - colorectal Paternal Grandfather         Age 60     Hypertension Paternal Grandfather      Colon Cancer Paternal Grandfather            Reviewed and updated as needed this visit by Provider         Review of Systems   ROS COMP: Constitutional, HEENT, cardiovascular, pulmonary, GI, , musculoskeletal, neuro, skin, endocrine and psych systems are negative, except as otherwise noted.     "  Objective    /74   Pulse 72   Temp 98  F (36.7  C) (Oral)   Ht 1.94 m (6' 4.38\")   Wt 76.2 kg (168 lb)   BMI 20.25 kg/m    There is no height or weight on file to calculate BMI.  Physical Exam   GENERAL: healthy, alert and no distress  EYES: Eyes grossly normal to inspection, PERRL and conjunctivae and sclerae normal  HENT: normal cephalic/atraumatic, ear canals and TM's normal, nose and mouth without ulcers or lesions, oropharynx clear, oral mucous membranes moist, tonsillar hypertrophy and swollen uvula   NECK: no adenopathy, no asymmetry, masses, or scars and thyroid normal to palpation  RESP: lungs clear to auscultation - no rales, rhonchi or wheezes  CV: regular rate and rhythm, normal S1 S2, no S3 or S4, no murmur, click or rub, no peripheral edema and peripheral pulses strong  ABDOMEN: soft, nontender, no hepatosplenomegaly, no masses and bowel sounds normal  MS: no gross musculoskeletal defects noted, no edema    Diagnostic Test Results:  Labs reviewed in Epic  No results found for this or any previous visit (from the past 24 hour(s)).        Assessment & Plan       ICD-10-CM    1. Tonsillitis J03.90 Beta strep group A culture   2. Throat pain R07.0 Rapid strep screen     Beta strep group A culture     Negative strep recommend salt water rinses ibuprofen Tylenol for pain         Return in about 6 months (around 7/31/2020), or if symptoms worsen or fail to improve.    NITA Breaux [facility]    "

## 2020-02-01 LAB
BACTERIA SPEC CULT: NORMAL
SPECIMEN SOURCE: NORMAL

## 2020-03-30 PROBLEM — M79.661 PAIN OF RIGHT LOWER LEG: Status: RESOLVED | Noted: 2019-05-16 | Resolved: 2020-03-30

## 2020-03-30 NOTE — PROGRESS NOTES
Refer to SOAP as discharge summary as patient did not return after last appointment to finish recommended course of physical therapy.

## 2020-12-06 ENCOUNTER — HEALTH MAINTENANCE LETTER (OUTPATIENT)
Age: 40
End: 2020-12-06

## 2021-09-25 ENCOUNTER — HEALTH MAINTENANCE LETTER (OUTPATIENT)
Age: 41
End: 2021-09-25

## 2022-01-15 ENCOUNTER — HEALTH MAINTENANCE LETTER (OUTPATIENT)
Age: 42
End: 2022-01-15

## 2022-12-06 ENCOUNTER — OFFICE VISIT (OUTPATIENT)
Dept: FAMILY MEDICINE | Facility: CLINIC | Age: 42
End: 2022-12-06
Payer: COMMERCIAL

## 2022-12-06 VITALS
BODY MASS INDEX: 23.74 KG/M2 | DIASTOLIC BLOOD PRESSURE: 60 MMHG | SYSTOLIC BLOOD PRESSURE: 100 MMHG | WEIGHT: 197 LBS | OXYGEN SATURATION: 98 % | HEART RATE: 69 BPM

## 2022-12-06 DIAGNOSIS — H00.019 HORDEOLUM EXTERNUM, UNSPECIFIED LATERALITY: Primary | ICD-10-CM

## 2022-12-06 PROCEDURE — 99213 OFFICE O/P EST LOW 20 MIN: CPT

## 2022-12-06 RX ORDER — ESCITALOPRAM OXALATE 5 MG/1
10 TABLET ORAL
COMMUNITY
Start: 2022-11-22

## 2022-12-06 RX ORDER — METHYLPHENIDATE HYDROCHLORIDE 10 MG/1
10 CAPSULE, EXTENDED RELEASE ORAL 2 TIMES DAILY
COMMUNITY
Start: 2022-11-29

## 2022-12-06 RX ORDER — ERYTHROMYCIN 5 MG/G
0.5 OINTMENT OPHTHALMIC 2 TIMES DAILY
Qty: 3.5 G | Refills: 1 | Status: SHIPPED | OUTPATIENT
Start: 2022-12-06

## 2022-12-06 ASSESSMENT — ENCOUNTER SYMPTOMS
FEVER: 0
EYE REDNESS: 1
CHILLS: 0
HEADACHES: 0
PHOTOPHOBIA: 1
SINUS PRESSURE: 0
EYE PAIN: 1
LIGHT-HEADEDNESS: 0
FACIAL SWELLING: 0
FATIGUE: 0
EYE DISCHARGE: 0
SINUS PAIN: 0
DIZZINESS: 0
EYE ITCHING: 0

## 2022-12-06 NOTE — PROGRESS NOTES
"  Problem List Items Addressed This Visit    None  Visit Diagnoses     Hordeolum externum, unspecified laterality    -  Primary    Relevant Medications    erythromycin (ROMYCIN) 5 MG/GM ophthalmic ointment    Other Relevant Orders    Adult Eye  Referral        Patient's history and physical exam are most consistent with multiple external hordeolum. Other differentials include but are not limited to chalazions and cellulitis of the eye. However, the appearance and the presence of pain with absence of drainage and other signs of infection are most consistent with multiple hordeolums. Likely that patient inadvertently spread infection with the use of contacts and constant rubbing of eyes. Prescribed erythromycin ophthalmic ointment today; discussed that with the extensive stye burden on bilateral eyes I have also placed an opthalmology consult if symptoms do not improve with the antibiotic or they worsen. He will seek out care if he develops new or worrisome symptoms such as vision changes, headaches, drainage, streaking redness or swollen glands. Patient expressed an understanding of and is in agreement with this plan.     NITA Bennett CNP on 12/6/2022 at 7:21 AM      Umberto Sanders is a 42 year old who presents for the following health issues:    Chief Complaint   Patient presents with     Stye / Hordeolum Evaluation       Patient states this problem been ongoing for approximately 2 weeks.  He initially noticed a bump on the right lower lid, which he states he \"popped.\"  He then developed 1 on the lower left lid and now both upper eyelids are sore.  All areas are mildly painful.  States he struggles with dry eyes baseline and is constantly rubbing his eyes.  At home he has tried warm compresses and saline eyedrops.  He is also tried tear free soap to wash the areas, however he feels as if his symptoms are not improving and may be worsening.  He wears contacts for work but has not been wearing " them since the onset of his symptoms. Today he denies any vision changes, headaches, dizziness, and systemic symptoms such as fever chills.  He has had styes in the past, but not to this degree.      History of Present Illness       Reason for visit:  Multiple sties in eyes for the past two weeks  Symptom onset:  1-2 weeks ago  Symptoms include:  Sties  Symptom intensity:  Moderate  Symptom progression:  Worsening  Had these symptoms before:  No    He eats 4 or more servings of fruits and vegetables daily.He consumes 0 sweetened beverage(s) daily.He exercises with enough effort to increase his heart rate 20 to 29 minutes per day.  He exercises with enough effort to increase his heart rate 3 or less days per week.   He is taking medications regularly.    Review of Systems   Constitutional: Negative for chills, fatigue and fever.   HENT: Negative for ear pain, facial swelling, hearing loss, sinus pressure and sinus pain.    Eyes: Positive for photophobia (baseline for patient, no worse today), pain and redness. Negative for discharge, itching and visual disturbance.   Neurological: Negative for dizziness, light-headedness and headaches.            Objective    /60 (BP Location: Left arm, Patient Position: Left side, Cuff Size: Adult Large)   Pulse 69   Wt 89.4 kg (197 lb)   SpO2 98%   BMI 23.74 kg/m    Body mass index is 23.74 kg/m .     Physical Exam  Vitals reviewed.   Constitutional:       General: He is not in acute distress.     Appearance: Normal appearance. He is not ill-appearing.   HENT:      Head: Normocephalic and atraumatic. No right periorbital erythema or left periorbital erythema.   Eyes:      General:         Right eye: Hordeolum (mildly inflamed hordeolum located on outer aspect of left lower lid) present. No discharge.         Left eye: Hordeolum (mildly inflamed hordeolum located on outer aspect of right lower lid) present.No discharge.      Extraocular Movements:      Right eye: Normal  extraocular motion and no nystagmus.      Left eye: Normal extraocular motion and no nystagmus.      Conjunctiva/sclera:      Right eye: Right conjunctiva is not injected.      Left eye: Left conjunctiva is injected (mild).   Musculoskeletal:      Cervical back: Normal range of motion and neck supple. No tenderness.   Lymphadenopathy:      Cervical: No cervical adenopathy.   Skin:     General: Skin is warm.   Neurological:      General: No focal deficit present.      Mental Status: He is alert and oriented to person, place, and time.   Psychiatric:         Mood and Affect: Mood normal.         Behavior: Behavior normal.        This note has been dictated using voice recognition software. Any grammatical or context distortions are unintentional and inherent to the software

## 2022-12-12 NOTE — TELEPHONE ENCOUNTER
FUTURE VISIT INFORMATION      FUTURE VISIT INFORMATION:    Date: 12/13/22    Time: 7:45AM    Location: [facility] [place]  REFERRAL INFORMATION:    Referring provider:  Renae Andrade APRN CNP    Referring providers clinic:  [facility] [place]     Reason for visit/diagnosis  hordeolum per pt referral and recs in epic    RECORDS REQUESTED FROM:       Clinic name Comments Records Status Imaging Status   [facility]  12/6/22 note and referral from Renae Andrade APRN CNP Epic    [facility] images   MRN: [id number]   2/4/2020 CT Temporal bone   8/4/2020 MR Head brain    Care everywhere  req 12/12/22 - PACs                             12/12/22 8AM called shruthi, images will be pushed shortly - Amay

## 2022-12-13 ENCOUNTER — OFFICE VISIT (OUTPATIENT)
Dept: OPHTHALMOLOGY | Facility: CLINIC | Age: 42
End: 2022-12-13
Payer: COMMERCIAL

## 2022-12-13 ENCOUNTER — PRE VISIT (OUTPATIENT)
Dept: OPHTHALMOLOGY | Facility: CLINIC | Age: 42
End: 2022-12-13

## 2022-12-13 DIAGNOSIS — H00.15 CHALAZION LEFT LOWER EYELID: ICD-10-CM

## 2022-12-13 DIAGNOSIS — H00.12 CHALAZION OF RIGHT LOWER EYELID: ICD-10-CM

## 2022-12-13 DIAGNOSIS — H00.11 CHALAZION OF RIGHT UPPER EYELID: Primary | ICD-10-CM

## 2022-12-13 DIAGNOSIS — H00.14 CHALAZION OF LEFT UPPER EYELID: ICD-10-CM

## 2022-12-13 DIAGNOSIS — H01.014 ULCERATIVE BLEPHARITIS OF UPPER EYELIDS OF BOTH EYES: ICD-10-CM

## 2022-12-13 DIAGNOSIS — H01.011 ULCERATIVE BLEPHARITIS OF UPPER EYELIDS OF BOTH EYES: ICD-10-CM

## 2022-12-13 PROCEDURE — 99243 OFF/OP CNSLTJ NEW/EST LOW 30: CPT | Performed by: OPHTHALMOLOGY

## 2022-12-13 RX ORDER — DOXYCYCLINE HYCLATE 50 MG/1
50 CAPSULE ORAL DAILY
Qty: 28 CAPSULE | Refills: 0 | Status: SHIPPED | OUTPATIENT
Start: 2022-12-13 | End: 2023-01-10

## 2022-12-13 ASSESSMENT — CONF VISUAL FIELD
OS_SUPERIOR_TEMPORAL_RESTRICTION: 0
OD_SUPERIOR_NASAL_RESTRICTION: 0
OD_SUPERIOR_TEMPORAL_RESTRICTION: 0
OS_SUPERIOR_NASAL_RESTRICTION: 0
OD_INFERIOR_NASAL_RESTRICTION: 0
OS_INFERIOR_TEMPORAL_RESTRICTION: 0
METHOD: COUNTING FINGERS
OS_NORMAL: 1
OD_INFERIOR_TEMPORAL_RESTRICTION: 0
OS_INFERIOR_NASAL_RESTRICTION: 0
OD_NORMAL: 1

## 2022-12-13 ASSESSMENT — VISUAL ACUITY
OD_CC: 20/20
METHOD: SNELLEN - LINEAR
OS_CC: 20/20
CORRECTION_TYPE: GLASSES

## 2022-12-13 ASSESSMENT — TONOMETRY
OD_IOP_MMHG: 10
OS_IOP_MMHG: 10
IOP_METHOD: ICARE

## 2022-12-13 NOTE — LETTER
2022         RE:  :  MRN: Tommy Best  1980  [id number]     Dear Dr. Renae Andrade,    Thank you for asking me to see your patient, Tommy Best, for an oculoplastic   consultation.  My assessment and plan are below.  For further details, please see my attached clinic note.      Chief Complaint(s) and History of Present Illness(es)     Hordeolum Evaluation            Associated symptoms: lid pain    Comments: Pt here for consult on Hordeolum each eye, referred by Renae Andrade CNP APRN          Comments    Pt wears contact lenses and is an eye rubber. Pt notes first one started   on or around thanksgiving. Pt has lid pain and discharge.   Pt using erythromycin and warm compresses, baby shampoo intermittently.   DAVEY JORGE 7:46 AM 2022                    Assessment & Plan     Tommy Best is a 42 year old male with the following diagnoses:     Encounter Diagnoses   Name Primary?     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 Use daily. You can use a warm washcloth with baby shampoo or one of these wipes.    -Ocusoft Plus lid wipes  -Ocusoft Plus Hypochlor foaming lid cleanser  -Systane lid wipes  -Tranquileyes 1% Tea Tree Eyelid & Facial Cleanser by [employer]  -Avenova Lid   -Cliradex Lid Wipes  -We Love Eyes Foaming Tea Tree Cleanser      Warm compresses: Use 4x/day for 5-10 minutes over closed eyelids  -Kim mask  -Tranquileyes beaded mask  -Mibo heating pad  -Meridian REST & RELIEF Eye Mask (Hot or Cold)  -I-RELIEF Therapy Mask  -OcuTherm Essentials Kit    You can also use a warm wet washcloth - however  this frequently loses heat quickly and can dry your skin out a bit so we recommend any of the above re-usable beaded/gel eyemasks      To purchase these products you can look over-the-counter at [employer] or purchase online at the following websites:  -www."[employer]/  -www.[employer]    You can also take over the counter omega 3s such as fishoil or flaxseed oil.     Discuss your contact lens wear with the doctor who provides them.  Using contact lenses for extended periods of time (1 year) is discouraged and can cause infection.    Do not wear contacts if your eye is red or irritated.            Patient disposition:   Return in about 1 month (around 1/13/2023) for Possible chalazion incision and drainage.         Again, thank you for allowing me to participate in the care of your patient.      Sincerely,    Lisa De Jesus MD  Department of Ophthalmology and Visual Neurosciences  [facility]    CC: NITA Bennett CNP  [address] [place] [facility] [address]  Via In Basket

## 2022-12-13 NOTE — NURSING NOTE
Chief Complaints and History of Present Illnesses   Patient presents with     Hordeolum Evaluation     Pt here for consult on Hordeolum each eye, referred by Renae MOYA     Chief Complaint(s) and History of Present Illness(es)     Hordeolum Evaluation            Associated symptoms: lid pain    Comments: Pt here for consult on Hordeolum each eye, referred by Renae MOYA          Comments    Pt wears contact lenses and is an eye rubber. Pt notes first one started on or around thanksgiving. Pt has lid pain and discharge.   Pt using erythromycin and warm compresses, baby shampoo intermittently.   DAVEY JORGE 7:46 AM December 13, 2022

## 2022-12-13 NOTE — PROGRESS NOTES
Chief Complaint(s) and History of Present Illness(es)     Hordeolum Evaluation            Associated symptoms: lid pain    Comments: Pt here for consult on Hordeolum each eye, referred by Renae MOYA          Comments    Pt wears contact lenses and is an eye rubber. Pt notes first one started   on or around thanksgiving. Pt has lid pain and discharge.   Pt using erythromycin and warm compresses, baby shampoo intermittently.   DAVEY JORGE 7:46 AM December 13, 2022                    Assessment & Plan     Tommy Best is a 42 year old male with the following diagnoses:     Encounter Diagnoses   Name Primary?     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 Use daily. You can use a warm washcloth with baby shampoo or one of these wipes.    -Ocusoft Plus lid wipes  -Ocusoft Plus Hypochlor foaming lid cleanser  -Systane lid wipes  -Tranquileyes 1% Tea Tree Eyelid & Facial Cleanser by [employer]  -Avenova Lid   -Cliradex Lid Wipes  -We Love Eyes Foaming Tea Tree Cleanser      Warm compresses: Use 4x/day for 5-10 minutes over closed eyelids  -Kim mask  -Tranquileyes beaded mask  -Mibo heating pad  -Dean REST & RELIEF Eye Mask (Hot or Cold)  -I-RELIEF Therapy Mask  -OcuTherm Essentials Kit    You can also use a warm wet washcloth - however this frequently loses heat quickly and can dry your skin out a bit so we recommend any of the above re-usable beaded/gel eyemasks      To purchase these products you can look over-the-counter at [employer] or purchase online at the following websites:  -www.[employer]/  -www.[employer]    You  can also take over the counter omega 3s such as fishoil or flaxseed oil.     Discuss your contact lens wear with the doctor who provides them.  Using contact lenses for extended periods of time (1 year) is discouraged and can cause infection.    Do not wear contacts if your eye is red or irritated.            Patient disposition:   Return in about 1 month (around 1/13/2023) for Possible chalazion incision and drainage.        Attending Physician Attestation: Complete documentation of historical and exam elements from today's encounter can be found in the full encounter summary report (not reduplicated in this progress note). I personally obtained the chief complaint(s) and history of present illness. I confirmed and edited as necessary the review of systems, past medical/surgical history, family history, social history, and examination findings as documented by others; and I examined the patient myself. I personally reviewed the relevant tests, images, and reports as documented above. I formulated and edited as necessary the assessment and plan and discussed the findings and management plan with the patient.  -Lisa De Jesus MD

## 2022-12-13 NOTE — PATIENT INSTRUCTIONS
Doxycycline - 50 mg daily for 4 weeks    Lid scrubs: These can help prevent buildup of debris on eyelids/eyelashes and help reduce inflammation of the eyelids (blepharitis). Use daily. You can use a warm washcloth with baby shampoo or one of these wipes.    -Ocusoft Plus lid wipes  -Ocusoft Plus Hypochlor foaming lid cleanser  -Systane lid wipes  -Tranquileyes 1% Tea Tree Eyelid & Facial Cleanser by [employer]  -Avenova Lid   -Cliradex Lid Wipes  -We Love Eyes Foaming Tea Tree Cleanser      Warm compresses: Use 4x/day for 5-10 minutes over closed eyelids  -Kim mask  -Tranquileyes beaded mask  -Mibo heating pad  -[place] REST & RELIEF Eye Mask (Hot or Cold)  -I-RELIEF Therapy Mask  -OcuTherm Essentials Kit    You can also use a warm wet washcloth - however this frequently loses heat quickly and can dry your skin out a bit so we recommend any of the above re-usable beaded/gel eyemasks      To purchase these products you can look over-the-counter at [employer] or purchase online at the following websites:  -www.[employer]/  -www.[employer]    You can also take over the counter omega 3s such as fishoil or flaxseed oil.     Discuss your contact lens wear with the doctor who provides them.  Using contact lenses for extended periods of time (1 year) is discouraged and can cause infection.    Do not wear contacts if your eye is red or irritated.

## 2022-12-14 ENCOUNTER — PRE VISIT (OUTPATIENT)
Dept: SURGERY | Facility: CLINIC | Age: 42
End: 2022-12-14

## 2023-01-07 ENCOUNTER — HEALTH MAINTENANCE LETTER (OUTPATIENT)
Age: 43
End: 2023-01-07

## 2023-01-13 ENCOUNTER — TELEPHONE (OUTPATIENT)
Dept: OPHTHALMOLOGY | Facility: CLINIC | Age: 43
End: 2023-01-13

## 2023-04-22 ENCOUNTER — HEALTH MAINTENANCE LETTER (OUTPATIENT)
Age: 43
End: 2023-04-22

## 2024-06-29 ENCOUNTER — HEALTH MAINTENANCE LETTER (OUTPATIENT)
Age: 44
End: 2024-06-29

## 2025-07-12 ENCOUNTER — HEALTH MAINTENANCE LETTER (OUTPATIENT)
Age: 45
End: 2025-07-12